# Patient Record
Sex: FEMALE | Race: BLACK OR AFRICAN AMERICAN | NOT HISPANIC OR LATINO | URBAN - METROPOLITAN AREA
[De-identification: names, ages, dates, MRNs, and addresses within clinical notes are randomized per-mention and may not be internally consistent; named-entity substitution may affect disease eponyms.]

---

## 2017-01-22 ENCOUNTER — INPATIENT (INPATIENT)
Facility: HOSPITAL | Age: 27
LOS: 2 days | Discharge: HOME | End: 2017-01-25
Attending: SURGERY

## 2017-06-27 DIAGNOSIS — R10.84 GENERALIZED ABDOMINAL PAIN: ICD-10-CM

## 2017-06-27 DIAGNOSIS — K81.0 ACUTE CHOLECYSTITIS: ICD-10-CM

## 2018-06-04 ENCOUNTER — APPOINTMENT (OUTPATIENT)
Dept: ENDOCRINOLOGY | Facility: CLINIC | Age: 28
End: 2018-06-04

## 2018-12-20 ENCOUNTER — INPATIENT (INPATIENT)
Facility: HOSPITAL | Age: 28
LOS: 1 days | Discharge: HOME | End: 2018-12-22
Attending: OBSTETRICS & GYNECOLOGY | Admitting: OBSTETRICS & GYNECOLOGY

## 2018-12-20 VITALS
DIASTOLIC BLOOD PRESSURE: 60 MMHG | TEMPERATURE: 99 F | WEIGHT: 229.94 LBS | OXYGEN SATURATION: 99 % | HEART RATE: 73 BPM | SYSTOLIC BLOOD PRESSURE: 133 MMHG | HEIGHT: 67.5 IN | RESPIRATION RATE: 18 BRPM

## 2018-12-20 DIAGNOSIS — Z90.49 ACQUIRED ABSENCE OF OTHER SPECIFIED PARTS OF DIGESTIVE TRACT: Chronic | ICD-10-CM

## 2018-12-20 LAB
ALBUMIN SERPL ELPH-MCNC: 4.4 G/DL — SIGNIFICANT CHANGE UP (ref 3.5–5.2)
ALP SERPL-CCNC: 42 U/L — SIGNIFICANT CHANGE UP (ref 30–115)
ALT FLD-CCNC: 9 U/L — SIGNIFICANT CHANGE UP (ref 0–41)
ANION GAP SERPL CALC-SCNC: 21 MMOL/L — HIGH (ref 7–14)
APPEARANCE UR: ABNORMAL
APPEARANCE UR: CLEAR — SIGNIFICANT CHANGE UP
AST SERPL-CCNC: 13 U/L — SIGNIFICANT CHANGE UP (ref 0–41)
BACTERIA # UR AUTO: ABNORMAL /HPF
BASOPHILS # BLD AUTO: 0.01 K/UL — SIGNIFICANT CHANGE UP (ref 0–0.2)
BASOPHILS NFR BLD AUTO: 0.1 % — SIGNIFICANT CHANGE UP (ref 0–1)
BILIRUB SERPL-MCNC: 0.4 MG/DL — SIGNIFICANT CHANGE UP (ref 0.2–1.2)
BILIRUB UR-MCNC: ABNORMAL
BILIRUB UR-MCNC: ABNORMAL
BUN SERPL-MCNC: 10 MG/DL — SIGNIFICANT CHANGE UP (ref 10–20)
CALCIUM SERPL-MCNC: 9.5 MG/DL — SIGNIFICANT CHANGE UP (ref 8.5–10.1)
CHLORIDE SERPL-SCNC: 99 MMOL/L — SIGNIFICANT CHANGE UP (ref 98–110)
CO2 SERPL-SCNC: 19 MMOL/L — SIGNIFICANT CHANGE UP (ref 17–32)
COLOR SPEC: ABNORMAL
COLOR SPEC: SIGNIFICANT CHANGE UP
CREAT SERPL-MCNC: 0.6 MG/DL — LOW (ref 0.7–1.5)
DIFF PNL FLD: ABNORMAL
DIFF PNL FLD: ABNORMAL
EOSINOPHIL # BLD AUTO: 0 K/UL — SIGNIFICANT CHANGE UP (ref 0–0.7)
EOSINOPHIL NFR BLD AUTO: 0 % — SIGNIFICANT CHANGE UP (ref 0–8)
EPI CELLS # UR: ABNORMAL /HPF
GLUCOSE SERPL-MCNC: 101 MG/DL — HIGH (ref 70–99)
GLUCOSE UR QL: NEGATIVE — SIGNIFICANT CHANGE UP
GLUCOSE UR QL: NEGATIVE — SIGNIFICANT CHANGE UP
HCG SERPL-ACNC: HIGH MIU/ML
HCT VFR BLD CALC: 36.3 % — LOW (ref 37–47)
HGB BLD-MCNC: 12.5 G/DL — SIGNIFICANT CHANGE UP (ref 12–16)
IMM GRANULOCYTES NFR BLD AUTO: 0.3 % — SIGNIFICANT CHANGE UP (ref 0.1–0.3)
KETONES UR-MCNC: >=80
KETONES UR-MCNC: >=80
LACTATE SERPL-SCNC: 0.9 MMOL/L — SIGNIFICANT CHANGE UP (ref 0.5–2.2)
LEUKOCYTE ESTERASE UR-ACNC: ABNORMAL
LEUKOCYTE ESTERASE UR-ACNC: ABNORMAL
LIDOCAIN IGE QN: 18 U/L — SIGNIFICANT CHANGE UP (ref 7–60)
LYMPHOCYTES # BLD AUTO: 1.18 K/UL — LOW (ref 1.2–3.4)
LYMPHOCYTES # BLD AUTO: 13.3 % — LOW (ref 20.5–51.1)
MAGNESIUM SERPL-MCNC: 2.1 MG/DL — SIGNIFICANT CHANGE UP (ref 1.8–2.4)
MCHC RBC-ENTMCNC: 27.7 PG — SIGNIFICANT CHANGE UP (ref 27–31)
MCHC RBC-ENTMCNC: 34.4 G/DL — SIGNIFICANT CHANGE UP (ref 32–37)
MCV RBC AUTO: 80.5 FL — LOW (ref 81–99)
MONOCYTES # BLD AUTO: 0.66 K/UL — HIGH (ref 0.1–0.6)
MONOCYTES NFR BLD AUTO: 7.4 % — SIGNIFICANT CHANGE UP (ref 1.7–9.3)
NEUTROPHILS # BLD AUTO: 7.02 K/UL — HIGH (ref 1.4–6.5)
NEUTROPHILS NFR BLD AUTO: 78.9 % — HIGH (ref 42.2–75.2)
NITRITE UR-MCNC: NEGATIVE — SIGNIFICANT CHANGE UP
NITRITE UR-MCNC: NEGATIVE — SIGNIFICANT CHANGE UP
PH UR: 6 — SIGNIFICANT CHANGE UP (ref 5–8)
PH UR: 6 — SIGNIFICANT CHANGE UP (ref 5–8)
PLATELET # BLD AUTO: 289 K/UL — SIGNIFICANT CHANGE UP (ref 130–400)
POTASSIUM SERPL-MCNC: 3.6 MMOL/L — SIGNIFICANT CHANGE UP (ref 3.5–5)
POTASSIUM SERPL-SCNC: 3.6 MMOL/L — SIGNIFICANT CHANGE UP (ref 3.5–5)
PROT SERPL-MCNC: 7.6 G/DL — SIGNIFICANT CHANGE UP (ref 6–8)
PROT UR-MCNC: 100
PROT UR-MCNC: 30
RBC # BLD: 4.51 M/UL — SIGNIFICANT CHANGE UP (ref 4.2–5.4)
RBC # FLD: 14.2 % — SIGNIFICANT CHANGE UP (ref 11.5–14.5)
RBC CASTS # UR COMP ASSIST: ABNORMAL /HPF
SODIUM SERPL-SCNC: 139 MMOL/L — SIGNIFICANT CHANGE UP (ref 135–146)
SP GR SPEC: >=1.03 — SIGNIFICANT CHANGE UP (ref 1.01–1.03)
SP GR SPEC: >=1.03 — SIGNIFICANT CHANGE UP (ref 1.01–1.03)
UROBILINOGEN FLD QL: 1 (ref 0.2–0.2)
UROBILINOGEN FLD QL: 1 (ref 0.2–0.2)
WBC # BLD: 8.9 K/UL — SIGNIFICANT CHANGE UP (ref 4.8–10.8)
WBC # FLD AUTO: 8.9 K/UL — SIGNIFICANT CHANGE UP (ref 4.8–10.8)
WBC UR QL: ABNORMAL /HPF

## 2018-12-20 RX ORDER — SODIUM CHLORIDE 9 MG/ML
1000 INJECTION, SOLUTION INTRAVENOUS
Qty: 0 | Refills: 0 | Status: DISCONTINUED | OUTPATIENT
Start: 2018-12-20 | End: 2018-12-21

## 2018-12-20 RX ORDER — CEFTRIAXONE 500 MG/1
1 INJECTION, POWDER, FOR SOLUTION INTRAMUSCULAR; INTRAVENOUS ONCE
Qty: 0 | Refills: 0 | Status: COMPLETED | OUTPATIENT
Start: 2018-12-20 | End: 2018-12-20

## 2018-12-20 RX ORDER — CEFTRIAXONE 500 MG/1
1 INJECTION, POWDER, FOR SOLUTION INTRAMUSCULAR; INTRAVENOUS EVERY 24 HOURS
Qty: 0 | Refills: 0 | Status: DISCONTINUED | OUTPATIENT
Start: 2018-12-21 | End: 2018-12-22

## 2018-12-20 RX ORDER — NITROFURANTOIN MACROCRYSTAL 50 MG
100 CAPSULE ORAL
Qty: 0 | Refills: 0 | Status: DISCONTINUED | OUTPATIENT
Start: 2018-12-20 | End: 2018-12-20

## 2018-12-20 RX ORDER — ONDANSETRON 8 MG/1
4 TABLET, FILM COATED ORAL ONCE
Qty: 0 | Refills: 0 | Status: COMPLETED | OUTPATIENT
Start: 2018-12-20 | End: 2018-12-20

## 2018-12-20 RX ORDER — SODIUM CHLORIDE 9 MG/ML
2000 INJECTION INTRAMUSCULAR; INTRAVENOUS; SUBCUTANEOUS ONCE
Qty: 0 | Refills: 0 | Status: COMPLETED | OUTPATIENT
Start: 2018-12-20 | End: 2018-12-20

## 2018-12-20 RX ORDER — ONDANSETRON 8 MG/1
4 TABLET, FILM COATED ORAL EVERY 6 HOURS
Qty: 0 | Refills: 0 | Status: DISCONTINUED | OUTPATIENT
Start: 2018-12-20 | End: 2018-12-22

## 2018-12-20 RX ADMIN — Medication 100 MILLIGRAM(S): at 07:57

## 2018-12-20 RX ADMIN — SODIUM CHLORIDE 4000 MILLILITER(S): 9 INJECTION INTRAMUSCULAR; INTRAVENOUS; SUBCUTANEOUS at 05:45

## 2018-12-20 RX ADMIN — CEFTRIAXONE 100 GRAM(S): 500 INJECTION, POWDER, FOR SOLUTION INTRAMUSCULAR; INTRAVENOUS at 10:41

## 2018-12-20 RX ADMIN — Medication 1 TABLET(S): at 14:33

## 2018-12-20 RX ADMIN — ONDANSETRON 4 MILLIGRAM(S): 8 TABLET, FILM COATED ORAL at 05:44

## 2018-12-20 RX ADMIN — ONDANSETRON 4 MILLIGRAM(S): 8 TABLET, FILM COATED ORAL at 14:35

## 2018-12-20 RX ADMIN — SODIUM CHLORIDE 125 MILLILITER(S): 9 INJECTION, SOLUTION INTRAVENOUS at 12:59

## 2018-12-20 NOTE — CONSULT NOTE ADULT - SUBJECTIVE AND OBJECTIVE BOX
LIZETTE WIGGINS [id number]  28y Female [id number] pregnant with PMH/PSH below notable for rheumatic heart disease treated with a long term course of antibiotics and lap jose c 2 years ago at this institution. Patient complains of 2 days of lower abdominal pain, nausea and vomiting Patient doesn't recall her last bowel movement but states she has not had any issues or changes in bowel habits. Patient had bedside US performed confirming live intrauterine pregnancy and left sided ovarian cysts. Due to pregnancy CT scan is contraindicated so surgery consult placed to r/o appendicitis.      PAST MEDICAL & SURGICAL HISTORY:  Rheumatic fever/heart disease  History of laparoscopic cholecystectomy        MEDICATIONS  (STANDING):  cefTRIAXone   IVPB 1 Gram(s) IV Intermittent Once  nitrofurantoin monohydrate/macrocrystals (MACROBID) 100 milliGRAM(s) Oral two times a day with meals    MEDICATIONS  (PRN):      Allergies    No Known Allergies    Intolerances        REVIEW OF SYSTEMS    [X] A ten-point review of systems was otherwise negative except as noted.  [ ] Due to altered mental status/intubation, subjective information were not able to be obtained from the patient. History was obtained, to the extent possible, from review of the chart and collateral sources of information.      Vital Signs Last 24 Hrs  T(C): 37.4 (20 Dec 2018 08:03), Max: 37.4 (20 Dec 2018 08:03)  T(F): 99.4 (20 Dec 2018 08:03), Max: 99.4 (20 Dec 2018 08:03)  HR: 61 (20 Dec 2018 08:03) (61 - 73)  BP: 103/59 (20 Dec 2018 08:03) (103/59 - 133/60)  BP(mean): --  RR: 20 (20 Dec 2018 08:03) (18 - 20)  SpO2: 100% (20 Dec 2018 08:03) (99% - 100%)    PHYSICAL EXAM:  GENERAL: NAD, well-appearing  CHEST/LUNG: Clear to auscultation bilaterally  HEART: Regular rate and rhythm  ABDOMEN: Soft, gravid tender to rlq and hypogastric region;   EXTREMITIES:  No clubbing, cyanosis, or edema      LABS:  Labs:  CAPILLARY BLOOD GLUCOSE                              12.5   8.90  )-----------( 289      ( 20 Dec 2018 05:13 )             36.3       Auto Neutrophil %: 78.9 % (12-20-18 @ 05:13)  Auto Immature Granulocyte %: 0.3 % (12-20-18 @ 05:13)    12-20    139  |  99  |  10  ----------------------------<  101<H>  3.6   |  19  |  0.6<L>      Calcium, Total Serum: 9.5 mg/dL (12-20-18 @ 05:13)      LFTs:             7.6  | 0.4  | 13       ------------------[42      ( 20 Dec 2018 05:13 )  4.4  | x    | 9           Lipase:18     Amylase:x         Lactate, Blood: 0.9 mmol/L (12-20-18 @ 05:13)      Urinalysis Basic - ( 20 Dec 2018 05:13 )    Color: Dark Yellow / Appearance: Cloudy / SG: >=1.030 / pH: x  Gluc: x / Ketone: >=80  / Bili: Moderate / Urobili: 1.0   Blood: x / Protein: 100 / Nitrite: Negative   Leuk Esterase: Small / RBC: 6-10 /HPF / WBC 6-10 /HPF   Sq Epi: x / Non Sq Epi: Moderate /HPF / Bacteria: Moderate /HPF      RADIOLOGY & ADDITIONAL STUDIES:  < from: US Transvaginal (12.20.18 @ 07:00) >  Single live intrauterine gestation with a crown-rump length corresponding   to gestational age of 5 weeks and 6 days. Fetal heart rate is faintly   visualized although cannot be quantified, likely due to early gestation.    Multiple left adnexal cysts, including a 2.1 cm complex cyst.    Small pelvic free fluid.    < end of copied text >

## 2018-12-20 NOTE — CONSULT NOTE ADULT - ASSESSMENT
28F [id number] pregnancy as per ultrasound presents with abdominal pain and vomiting for 2 days, no fevers or elevation in wbc. UA+ve and signs of CVA tenderness concerning for pyelo    Plan:  As per ED patient being admitted to OB service for pyelo  MRI to r/o appendicitis  Will continue to follow     Plan discussed with Dr. Pineda

## 2018-12-20 NOTE — ED PROVIDER NOTE - MEDICAL DECISION MAKING DETAILS
Pt with continued symptoms in the ED despite treatment.  Pt needed to be admitted for continued treatment.  Pt adm to gyn with surgery following.

## 2018-12-20 NOTE — ED PROVIDER NOTE - NS ED ROS FT
Constitutional: NAD  Eyes: No visual changes, eye pain or discharge.  ENMT: No hearing changes, pain, discharge or infections. No neck pain or stiffness.  Cardiac: No chest pain, SOB or edema. No chest pain with exertion.  Respiratory: No cough or respiratory distress.   GI: +nausea, vomiting, abdominal pain.  : No dysuria, frequency or burning.  MS: No myalgia, muscle weakness, joint pain or back pain.  Neuro: No headache. +generalized weakness. No LOC.  Skin: No skin rash.  Heme: No bruising

## 2018-12-20 NOTE — ED PROVIDER NOTE - CARE PLAN
Principal Discharge DX:	UTI (urinary tract infection)  Secondary Diagnosis:	RLQ abdominal pain  Secondary Diagnosis:	Flank pain Principal Discharge DX:	Pyelonephritis, acute  Secondary Diagnosis:	RLQ abdominal pain  Secondary Diagnosis:	Flank pain  Secondary Diagnosis:	Pregnant

## 2018-12-20 NOTE — ED ADULT NURSE NOTE - CHPI ED NUR SYMPTOMS NEG
no diarrhea/no chills/no blood in stool/no burning urination/no hematuria/no dysuria/no abdominal distension/no fever

## 2018-12-20 NOTE — ED PROVIDER NOTE - ATTENDING CONTRIBUTION TO CARE
I personally evaluated the patient. I reviewed the Resident’s or Physician Assistant’s note (as assigned above), and agree with the findings and plan except as documented in my note.  29 F, LMP 11/9/18, + home pregnancy test 4 days ago with about 6 days of nausea, nbnb vomiting and right pelvic pain. No lightheadedness, no LOC. No dysuria, hematuria, no vaginal bleeding. VS reviewed, pt well appearing, NAD. Head ncat, normal s1s2 without any murmurs, Lungs CTAB with normal work of breathing. abd +BS, s/nd/+ right pelvic ttp w/o guarding or rebound, extremities wnl, neuro exam grossly normal. No acute skin rashes. Plan is labs, antiemetics, pelvic US and reassess.

## 2018-12-20 NOTE — ED PROVIDER NOTE - PROGRESS NOTE DETAILS
received sign out from Dr. Zuniga Endorsed to Dr. Mims Pt seen by gyn and surgery in the ED.  Gyn feels sx c/w pyelonephritis and adm pt to their service.  Surg eval and secondary to rlq ttp rec MRI to eval for appi.  Dr. Schroeder accepts pt for adm and gyn/surg following MRI.  Pt agreeable to plan.

## 2018-12-20 NOTE — CONSULT NOTE ADULT - ASSESSMENT
27 yo  at 5w6d GA by LMP of 2018 consistent with CRL today    FINAL ASSESSMENT PENDING ATTENDING REVIEW

## 2018-12-20 NOTE — ED PROVIDER NOTE - PHYSICAL EXAMINATION
General: AOx4, Non toxic appearing, NAD, speaking in full sentences.   Skin: Warm and dry, no acute rash.   Head:  Normocephalic, atraumatic.   EENT: PERRL/EOMI, conjunctiva and sclera clear. MM moist, no nasal discharge.    Neck: Supple nt, no meningeal signs.   Heart RRR s1s2 nl, no rub/murmur.   Lungs- No retractions, BS equal, CTAB.   Abdomen: Soft nd, diffusely ttp over lower abdomen, w/ guarding.    Extremities- moves all, +equal distal pulses, brisk cap refill. No LE edema, calves nttp b/l.  Neuro: Sensation wnl, CN 2-12 grossly intact. +5/5 muscle strength throughout.  Psych: Cooperative, appropriate General: AOx4, Non toxic appearing, NAD, speaking in full sentences.   Skin: Warm and dry, no acute rash.   Head:  Normocephalic, atraumatic.   EENT: PERRL/EOMI, conjunctiva and sclera clear. MM moist, no nasal discharge.    Neck: Supple nt, no meningeal signs.   Heart RRR s1s2 nl, no rub/murmur.   Lungs- No retractions, BS equal, CTAB.   Abdomen: Soft nd, diffusely ttp over lower abdomen, w/ guarding.    : Normal appearing female genitalia, no masses/lesions/erythema/discharge. Os closed. No active bleeding/clots. No CMT. +R adnexal tenderness.   Extremities- moves all, +equal distal pulses, brisk cap refill. No LE edema, calves nttp b/l.  Neuro: Sensation wnl, CN 2-12 grossly intact. +5/5 muscle strength throughout.  Psych: Cooperative, appropriate

## 2018-12-20 NOTE — ED PROVIDER NOTE - OBJECTIVE STATEMENT
28F @6 weeks EGA p/f chills, nausea and vomiting since Sunday. Ptc/o lower abdominal cramping which started today. Denies fever, diarrhea, dysuria, vaginal bleeding. Pt sees Dr Monterroso, had confirmatory US last week was told it was too early to see FHR.

## 2018-12-20 NOTE — H&P ADULT - ASSESSMENT
29 yo  at 5w6d GA by LMP of 2018 consistent with CRL today with left ovarian simple cyst and likely left corpus luteum cyst, with pyelonephritis  -Discussed presence of ovarian cysts with patient and that ovarian torsion is very unlikely. Discussed risk of appendicitis vs pyelonephritis, given the full clinical picture, pyelonephritis is far more likely  -Admit to GYN  -Rocephin 1g IV Q24H  -Retroperitoneal sono  -MRI per surgery recommendations to r/o appendicitis, will f/u futher recommendations from general surgery  -AM labs  -NPO for MRI, then regular diet as tolerated  -IV fluids  -Prenatal vitamins  -f/u temps  -Zofran PRN for nausea    Discussed with Dr. Nichols and Dr. Cisneros

## 2018-12-20 NOTE — H&P ADULT - NSHPLABSRESULTS_GEN_ALL_CORE
12.5   8.90  )-----------( 289      ( 20 Dec 2018 05:13 )             36.3     HCG Quantitative, Serum: 56733.0 mIU/mL (12-20-18 @ 05:13)      12-20    139  |  99  |  10  ----------------------------<  101<H>  3.6   |  19  |  0.6<L>    Ca    9.5      20 Dec 2018 05:13  Mg     2.1     12-20    TPro  7.6  /  Alb  4.4  /  TBili  0.4  /  DBili  x   /  AST  13  /  ALT  9   /  AlkPhos  42  12-20      Urinalysis Basic - ( 20 Dec 2018 05:13 )    Color: Dark Yellow / Appearance: Cloudy / SG: >=1.030 / pH: x  Gluc: x / Ketone: >=80  / Bili: Moderate / Urobili: 1.0   Blood: x / Protein: 100 / Nitrite: Negative   Leuk Esterase: Small / RBC: 6-10 /HPF / WBC 6-10 /HPF   Sq Epi: x / Non Sq Epi: Moderate /HPF / Bacteria: Moderate /HPF          RADIOLOGY & ADDITIONAL STUDIES:  < from: US Transvaginal (12.20.18 @ 07:00) >  FINDINGS:    UTERUS: Measures 8.8 x 6.3 x 4.8 cm. There is a single intrauterine   gestation with visualization of a yolk sac. Crown-rump length corresponds   to a gestational age of 5 weeks and 6 days; mean sac diameter corresponds   to a gestational age of 6 weeks and 3 days. Fetal heart rate is faintly   visualized although cannot be quantified. No evidence of subchorionic   hematoma. There is an intramural uterine fibroid measuring 1.5 cm.    RIGHT OVARY: measures 3.4 x 1.9 x 1.7 cm, and is unremarkable. Doppler   flow is demonstrated to the right ovary.     LEFT OVARY: measures 5.8 x 4.3 x 3.5 cm. There are multiple cysts in the   left adnexa including a simple cyst measuring up to 3.6 cm and a complex   cyst measuring up to 2.1 cm. Doppler flow is demonstrated to the left   ovary.     OTHER: There is small pelvic free fluid.    IMPRESSION:    Single live intrauterine gestation with a crown-rump length corresponding   to gestational age of 5 weeks and 6 days. Fetal heart rate is faintly   visualized although cannot be quantified, likely due to early gestation.    Multiple left adnexal cysts, including a 2.1 cm complex cyst.    Small pelvic free fluid.    Follow-up ultrasound is recommended.    < end of copied text >

## 2018-12-20 NOTE — H&P ADULT - NSHPPHYSICALEXAM_GEN_ALL_CORE
Vital Signs Last 24 Hrs  T(F): 99.4 (20 Dec 2018 08:03), Max: 99.4 (20 Dec 2018 08:03)  HR: 61 (20 Dec 2018 08:03) (61 - 73)  BP: 103/59 (20 Dec 2018 08:03) (103/59 - 133/60)  RR: 20 (20 Dec 2018 08:03) (18 - 20)    General Appearance - AAOx3, NAD  Heart - S1S2 regular rate and rhythm  Lung - CTA Bilaterally  Abdomen - Soft, RLQ mild tenderness, nondistended, no rebound, no rigidity, no guarding, bowel sounds present, suprapubic tenderness, right CVA tenderness    GYN/Pelvis:    Labia Majora - Normal  Labia Minora - Normal  Clitoris - Normal  Urethra - Normal  Vagina - Normal, no bleeding, no discharge  Cervix - Normal, closed, no bleeding, no abnormalities, no CMT    Uterus:  Size - Normal  Tenderness - None  Mass - None  Freely mobile    Adnexa:  Masses - None  Tenderness - None Vital Signs Last 24 Hrs  T(F): 99.4 (20 Dec 2018 08:03), Max: 99.4 (20 Dec 2018 08:03)  HR: 61 (20 Dec 2018 08:03) (61 - 73)  BP: 103/59 (20 Dec 2018 08:03) (103/59 - 133/60)  RR: 20 (20 Dec 2018 08:03) (18 - 20)    ROS: negative for constitutional, heent, cardio, resp, gi, ext, neuro, hematologic, psychiatric symptoms        General Appearance - AAOx3, NAD  Heart - S1S2 regular rate and rhythm  Lung - CTA Bilaterally  Abdomen - Soft, RLQ mild tenderness, nondistended, no rebound, no rigidity, no guarding, bowel sounds present, suprapubic tenderness, right CVA tenderness    GYN/Pelvis:    Labia Majora - Normal  Labia Minora - Normal  Clitoris - Normal  Urethra - Normal  Vagina - Normal, no bleeding, no discharge  Cervix - Normal, closed, no bleeding, no abnormalities, no CMT    Uterus:  Size - Normal  Tenderness - None  Mass - None  Freely mobile    Adnexa:  Masses - None  Tenderness - None

## 2018-12-20 NOTE — CONSULT NOTE ADULT - SUBJECTIVE AND OBJECTIVE BOX
Chief Complaint: Nausea, vomiting, and abdominal pain    HPI: 29 yo  at 5w6d GA by LMP of 2018 confirmed by ED krystalo today, h/o rheumatic fever with valve defect at age 12 s/p prophylactic antibiotics from age 12 to age 19 presents for nausea and vomiting for 3 days, worsening, several times per day, nonbloody, nonbilious, associated with inability to tolerate PO for 2 days. She reports cramping lower abdominal pain, mild in nature, nonradiating, worse on the right side, starting yesterday afternoon, intermittent. She did not void at all yesterday, however she was able to void clear urine today in ED following administration of IV fluids. She denies dizziness, weakness, fevers, chills, shortness of breath, chest pain, dysuria, hematuria, vaginal bleeding, vaginal discharge, diarrhea, or constipation. This was an unplanned but desired pregnancy.    She saw her obgyn Dr. Monterroso 3 days ago who confirmed her pregnancy. At that time she was only feeling mild nausea.    She currently follow with a primary care physician and was told she no longer required any intervention for her history of rheumatic fever.    Ob/Gyn History:                   LMP -   2018                Cycle Length - regular, monthly  Denies history of ovarian cysts, uterine fibroids, abnormal paps, or STIs  Last Pap Smear - 1 week ago.    Denies the following: constitutional symptoms, visual symptoms, cardiovascular symptoms, respiratory symptoms, GI symptoms, musculoskeletal symptoms, skin symptoms, neurologic symptoms, hematologic symptoms, allergic symptoms, psychiatric symptoms  Except any pertinent positives listed.     Home Medications:  Prenatal vitamins    Allergies  No Known Allergies    PAST MEDICAL & SURGICAL HISTORY:  Rheumatic fever/heart disease  History of laparoscopic cholecystectomy      FAMILY HISTORY:  Denies    SOCIAL HISTORY: Denies cigarette use, alcohol use, or illicit drug use    Vital Signs Last 24 Hrs  T(F): 99.4 (20 Dec 2018 08:03), Max: 99.4 (20 Dec 2018 08:03)  HR: 61 (20 Dec 2018 08:03) (61 - 73)  BP: 103/59 (20 Dec 2018 08:03) (103/59 - 133/60)  RR: 20 (20 Dec 2018 08:03) (18 - 20)    General Appearance - AAOx3, NAD  Heart - S1S2 regular rate and rhythm  Lung - CTA Bilaterally  Abdomen - Soft, nontender, nondistended, no rebound, no rigidity, no guarding, bowel sounds present    GYN/Pelvis:    Labia Majora - Normal  Labia Minora - Normal  Clitoris - Normal  Urethra - Normal  Vagina - Normal  Cervix - Normal    Uterus:  Size - Normal  Tenderness - None  Mass - None  Freely mobile    Adnexa:  Masses - None  Tenderness - None      LABS:                        12.5   8.90  )-----------( 289      ( 20 Dec 2018 05:13 )             36.3     HCG Quantitative, Serum: 94599.0 mIU/mL (18 @ 05:13)          139  |  99  |  10  ----------------------------<  101<H>  3.6   |  19  |  0.6<L>    Ca    9.5      20 Dec 2018 05:13  Mg     2.1         TPro  7.6  /  Alb  4.4  /  TBili  0.4  /  DBili  x   /  AST  13  /  ALT  9   /  AlkPhos  42        Urinalysis Basic - ( 20 Dec 2018 05:13 )    Color: Dark Yellow / Appearance: Cloudy / SG: >=1.030 / pH: x  Gluc: x / Ketone: >=80  / Bili: Moderate / Urobili: 1.0   Blood: x / Protein: 100 / Nitrite: Negative   Leuk Esterase: Small / RBC: 6-10 /HPF / WBC 6-10 /HPF   Sq Epi: x / Non Sq Epi: Moderate /HPF / Bacteria: Moderate /HPF          RADIOLOGY & ADDITIONAL STUDIES:  < from: US Transvaginal (18 @ 07:00) >  FINDINGS:    UTERUS: Measures 8.8 x 6.3 x 4.8 cm. There is a single intrauterine   gestation with visualization of a yolk sac. Crown-rump length corresponds   to a gestational age of 5 weeks and 6 days; mean sac diameter corresponds   to a gestational age of 6 weeks and 3 days. Fetal heart rate is faintly   visualized although cannot be quantified. No evidence of subchorionic   hematoma. There is an intramural uterine fibroid measuring 1.5 cm.    RIGHT OVARY: measures 3.4 x 1.9 x 1.7 cm, and is unremarkable. Doppler   flow is demonstrated to the right ovary.     LEFT OVARY: measures 5.8 x 4.3 x 3.5 cm. There are multiple cysts in the   left adnexa including a simple cyst measuring up to 3.6 cm and a complex   cyst measuring up to 2.1 cm. Doppler flow is demonstrated to the left   ovary.     OTHER: There is small pelvic free fluid.    IMPRESSION:    Single live intrauterine gestation with a crown-rump length corresponding   to gestational age of 5 weeks and 6 days. Fetal heart rate is faintly   visualized although cannot be quantified, likely due to early gestation.    Multiple left adnexal cysts, including a 2.1 cm complex cyst.    Small pelvic free fluid.    Follow-up ultrasound is recommended.    < end of copied text > Chief Complaint: Nausea, vomiting, and abdominal pain    HPI: 29 yo  at 5w6d GA by LMP of 2018 confirmed by ED krystalo today, h/o rheumatic fever with valve defect at age 12 s/p prophylactic antibiotics from age 12 to age 19 presents for nausea and vomiting for 3 days, worsening, several times per day, nonbloody, nonbilious, associated with inability to tolerate PO for 2 days. She reports cramping lower abdominal pain, mild in nature, nonradiating, worse on the right side, starting yesterday afternoon, intermittent. She did not void at all yesterday, however she was able to void clear urine today in ED following administration of IV fluids. She denies dizziness, weakness, fevers, chills, shortness of breath, chest pain, dysuria, hematuria, vaginal bleeding, vaginal discharge, diarrhea, or constipation. This was an unplanned but desired pregnancy.    She saw her obgyn Dr. Monterroso 3 days ago who confirmed her pregnancy. At that time she was only feeling mild nausea.    She currently follow with a primary care physician and was told she no longer required any intervention for her history of rheumatic fever.    Ob/Gyn History:                   LMP -   2018                Cycle Length - regular, monthly  Denies history of ovarian cysts, uterine fibroids, abnormal paps, or STIs  Last Pap Smear - 1 week ago.    Denies the following: constitutional symptoms, visual symptoms, cardiovascular symptoms, respiratory symptoms, GI symptoms, musculoskeletal symptoms, skin symptoms, neurologic symptoms, hematologic symptoms, allergic symptoms, psychiatric symptoms  Except any pertinent positives listed.     Home Medications:  Prenatal vitamins    Allergies  No Known Allergies    PAST MEDICAL & SURGICAL HISTORY:  Rheumatic fever/heart disease  History of laparoscopic cholecystectomy      FAMILY HISTORY:  Denies    SOCIAL HISTORY: Denies cigarette use, alcohol use, or illicit drug use    Vital Signs Last 24 Hrs  T(F): 99.4 (20 Dec 2018 08:03), Max: 99.4 (20 Dec 2018 08:03)  HR: 61 (20 Dec 2018 08:03) (61 - 73)  BP: 103/59 (20 Dec 2018 08:03) (103/59 - 133/60)  RR: 20 (20 Dec 2018 08:03) (18 - 20)    General Appearance - AAOx3, NAD  Heart - S1S2 regular rate and rhythm  Lung - CTA Bilaterally  Abdomen - Soft, RLQ mild tenderness, nondistended, no rebound, no rigidity, no guarding, bowel sounds present, suprapubic tenderness, right CVA tenderness    GYN/Pelvis:    Labia Majora - Normal  Labia Minora - Normal  Clitoris - Normal  Urethra - Normal  Vagina - Normal, no bleeding, no discharge  Cervix - Normal, closed, no bleeding, no abnormalities, no CMT    Uterus:  Size - Normal  Tenderness - None  Mass - None  Freely mobile    Adnexa:  Masses - None  Tenderness - None      LABS:                        12.5   8.90  )-----------( 289      ( 20 Dec 2018 05:13 )             36.3     HCG Quantitative, Serum: 19901.0 mIU/mL (18 @ 05:13)          139  |  99  |  10  ----------------------------<  101<H>  3.6   |  19  |  0.6<L>    Ca    9.5      20 Dec 2018 05:13  Mg     2.1         TPro  7.6  /  Alb  4.4  /  TBili  0.4  /  DBili  x   /  AST  13  /  ALT  9   /  AlkPhos  42        Urinalysis Basic - ( 20 Dec 2018 05:13 )    Color: Dark Yellow / Appearance: Cloudy / SG: >=1.030 / pH: x  Gluc: x / Ketone: >=80  / Bili: Moderate / Urobili: 1.0   Blood: x / Protein: 100 / Nitrite: Negative   Leuk Esterase: Small / RBC: 6-10 /HPF / WBC 6-10 /HPF   Sq Epi: x / Non Sq Epi: Moderate /HPF / Bacteria: Moderate /HPF          RADIOLOGY & ADDITIONAL STUDIES:  < from: US Transvaginal (18 @ 07:00) >  FINDINGS:    UTERUS: Measures 8.8 x 6.3 x 4.8 cm. There is a single intrauterine   gestation with visualization of a yolk sac. Crown-rump length corresponds   to a gestational age of 5 weeks and 6 days; mean sac diameter corresponds   to a gestational age of 6 weeks and 3 days. Fetal heart rate is faintly   visualized although cannot be quantified. No evidence of subchorionic   hematoma. There is an intramural uterine fibroid measuring 1.5 cm.    RIGHT OVARY: measures 3.4 x 1.9 x 1.7 cm, and is unremarkable. Doppler   flow is demonstrated to the right ovary.     LEFT OVARY: measures 5.8 x 4.3 x 3.5 cm. There are multiple cysts in the   left adnexa including a simple cyst measuring up to 3.6 cm and a complex   cyst measuring up to 2.1 cm. Doppler flow is demonstrated to the left   ovary.     OTHER: There is small pelvic free fluid.    IMPRESSION:    Single live intrauterine gestation with a crown-rump length corresponding   to gestational age of 5 weeks and 6 days. Fetal heart rate is faintly   visualized although cannot be quantified, likely due to early gestation.    Multiple left adnexal cysts, including a 2.1 cm complex cyst.    Small pelvic free fluid.    Follow-up ultrasound is recommended.    < end of copied text >

## 2018-12-20 NOTE — H&P ADULT - HISTORY OF PRESENT ILLNESS
Chief Complaint: Nausea, vomiting, and abdominal pain    HPI: 29 yo  at 5w6d GA by LMP of 2018 confirmed by ED krystalo today, h/o rheumatic fever with valve defect at age 12 s/p prophylactic antibiotics from age 12 to age 19 presents for nausea and vomiting for 3 days, worsening, several times per day, nonbloody, nonbilious, associated with inability to tolerate PO for 2 days. She reports cramping lower abdominal pain, mild in nature, nonradiating, worse on the right side, starting yesterday afternoon, intermittent. She did not void at all yesterday, however she was able to void clear urine today in ED following administration of IV fluids. She denies dizziness, weakness, fevers, chills, shortness of breath, chest pain, dysuria, hematuria, vaginal bleeding, vaginal discharge, diarrhea, or constipation. This was an unplanned but desired pregnancy.    She saw her obgyn Dr. Monterroso 3 days ago who confirmed her pregnancy. At that time she was only feeling mild nausea.    She currently follow with a primary care physician and was told she no longer required any intervention for her history of rheumatic fever.    Ob/Gyn History:                   LMP -   2018                Cycle Length - regular, monthly  Denies history of ovarian cysts, uterine fibroids, abnormal paps, or STIs  Last Pap Smear - 1 week ago.

## 2018-12-21 LAB
ANION GAP SERPL CALC-SCNC: 18 MMOL/L — HIGH (ref 7–14)
BUN SERPL-MCNC: 5 MG/DL — LOW (ref 10–20)
CALCIUM SERPL-MCNC: 8.9 MG/DL — SIGNIFICANT CHANGE UP (ref 8.5–10.1)
CHLORIDE SERPL-SCNC: 98 MMOL/L — SIGNIFICANT CHANGE UP (ref 98–110)
CO2 SERPL-SCNC: 22 MMOL/L — SIGNIFICANT CHANGE UP (ref 17–32)
CREAT SERPL-MCNC: 0.6 MG/DL — LOW (ref 0.7–1.5)
CULTURE RESULTS: NO GROWTH — SIGNIFICANT CHANGE UP
GLUCOSE SERPL-MCNC: 100 MG/DL — HIGH (ref 70–99)
HCT VFR BLD CALC: 33.4 % — LOW (ref 37–47)
HGB BLD-MCNC: 11.3 G/DL — LOW (ref 12–16)
MCHC RBC-ENTMCNC: 28 PG — SIGNIFICANT CHANGE UP (ref 27–31)
MCHC RBC-ENTMCNC: 33.8 G/DL — SIGNIFICANT CHANGE UP (ref 32–37)
MCV RBC AUTO: 82.7 FL — SIGNIFICANT CHANGE UP (ref 81–99)
NRBC # BLD: 0 /100 WBCS — SIGNIFICANT CHANGE UP (ref 0–0)
PLATELET # BLD AUTO: 272 K/UL — SIGNIFICANT CHANGE UP (ref 130–400)
POTASSIUM SERPL-MCNC: 3.4 MMOL/L — LOW (ref 3.5–5)
POTASSIUM SERPL-SCNC: 3.4 MMOL/L — LOW (ref 3.5–5)
RBC # BLD: 4.04 M/UL — LOW (ref 4.2–5.4)
RBC # FLD: 14.4 % — SIGNIFICANT CHANGE UP (ref 11.5–14.5)
SODIUM SERPL-SCNC: 138 MMOL/L — SIGNIFICANT CHANGE UP (ref 135–146)
SPECIMEN SOURCE: SIGNIFICANT CHANGE UP
WBC # BLD: 6.48 K/UL — SIGNIFICANT CHANGE UP (ref 4.8–10.8)
WBC # FLD AUTO: 6.48 K/UL — SIGNIFICANT CHANGE UP (ref 4.8–10.8)

## 2018-12-21 RX ADMIN — ONDANSETRON 4 MILLIGRAM(S): 8 TABLET, FILM COATED ORAL at 20:49

## 2018-12-21 RX ADMIN — ONDANSETRON 4 MILLIGRAM(S): 8 TABLET, FILM COATED ORAL at 02:35

## 2018-12-21 RX ADMIN — Medication 1 TABLET(S): at 11:30

## 2018-12-21 RX ADMIN — CEFTRIAXONE 100 GRAM(S): 500 INJECTION, POWDER, FOR SOLUTION INTRAMUSCULAR; INTRAVENOUS at 10:05

## 2018-12-21 RX ADMIN — SODIUM CHLORIDE 125 MILLILITER(S): 9 INJECTION, SOLUTION INTRAVENOUS at 06:15

## 2018-12-21 NOTE — PROGRESS NOTE ADULT - ASSESSMENT
Assessment:  28y Female patient admitted, r/o appendicitis    Plan:  MRI appreciated  No evidence of appendicitis  Gravid uterus  No surgical intervention indicated at this time

## 2018-12-21 NOTE — CONSULT NOTE ADULT - ASSESSMENT
27 yo  at 6w GA by LMP of 2018 consistent with CRL today with left ovarian simple cyst and likely left corpus luteum cyst, with pyelonephritis  -Discussed presence of ovarian cysts with patient and that ovarian torsion is very unlikely. Discussed risk of appendicitis vs pyelonephritis, given the full clinical picture, pyelonephritis is far more likely  -cont Rocephin 1g IV Q24H  -AM labs  -regular diet as tolerated  -IV fluids  -Prenatal vitamins  -f/u temps  -Zofran PRN for nausea 27 yo  at 6w GA by LMP of 2018 consistent with first trimester sono, with left ovarian simple cyst and likely left corpus luteum cyst, with pyelonephritis  -cont Rocephin 1g IV Q24H  -AM labs  -regular diet as tolerated  -IV fluids  -Prenatal vitamins  -f/u temps  -Zofran PRN for nausea 29 yo  at 6w GA by LMP of 2018 consistent with first trimester sono, with left ovarian simple cyst and likely left corpus luteum cyst, with musculoskeletal pain vs clinical pyelonephritis. Given h/o prior similar episode of back pain resolved with naproxen, and lack of fever, or white count, lack of nitrates in the urine, musculoskeletal pain would fit the picture. Given the fact that macrobid PO was given yesterday AM, and the rocephin was started then the catheterized urine culture was done, the liklihood of a negative Ucx is high however, pyelonephritis cannot be ruled out. If the culture is positive then it is likely mild pyelonephritis. The plan is to await the CBC result from the morning to eval WBC count, if elevated, will recc to wait for Ucx to return and treat accordingly, if wbc count still in the normal range, can wait for the 2nd dose of rocephin to be given IV then d/c home with keflex 500mg 4x daily for 7 days and will f/u Ucx final results, and can f/u with PMD for repeat Ucx in 2-3 weeks.   -cont Rocephin 1g IV Q24H  -f/u AM labs  -regular diet as tolerated  -IV fluids  -Prenatal vitamins  -f/u temps  -Zofran PRN for nausea

## 2018-12-21 NOTE — CONSULT NOTE ADULT - SUBJECTIVE AND OBJECTIVE BOX
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 She reports cramping lower abdominal pain, mild in nature, nonradiating, worse on the right side, starting yesterday afternoon, intermittent. She did not void at all yesterday, however she was able to void clear urine today in ED following administration of IV fluids. She denies dizziness, weakness, fevers, chills, shortness of breath, chest pain, dysuria, hematuria, vaginal bleeding, vaginal discharge, diarrhea, or constipation. This was an unplanned but desired pregnancy. She saw her obgyn Dr. Monterroso 3 days ago who confirmed her pregnancy. At that time she was only feeling mild nausea. She currently follow with a primary care physician and was told she no longer required any intervention for her history of rheumatic fever. (20 Dec 2018 11:55)    Currently the patient reports: she feels better, overall improved, still feels some lower abdominal pain and back pain but much improved. Denies chills, dysuria, hematuria, nausea, vomiting, chest pain, SOB, diarrhea, or constipation.    Ob/Gyn History:                   LMP -   2018                Cycle Length - regular, monthly  Denies history of ovarian cysts, uterine fibroids, abnormal paps, or STIs  Last Pap Smear - 1 week ago    PAST MEDICAL & SURGICAL HISTORY:  Rheumatic fever/heart disease  History of laparoscopic cholecystectomy    FAMILY HISTORY:  No pertinent family history in first degree relatives    Social History: Denies ETOH, smoking and drugs.     Home medications: Home Medications:    No Known Allergies    REVIEW OF SYSTEMS:  CONSTITUTIONAL: No weakness, fevers or chills  EYES/ENT: No visual changes;  No vertigo or throat pain   NECK: No pain or stiffness  RESPIRATORY: No cough, wheezing, hemoptysis; No shortness of breath  CARDIOVASCULAR: No chest pain or palpitations  GASTROINTESTINAL: No abdominal or epigastric pain. No nausea, vomiting, or hematemesis; No diarrhea or constipation. No melena or hematochezia.  GENITOURINARY: No dysuria, frequency or hematuria  NEUROLOGICAL: No numbness or weakness  SKIN: No itching, burning, rashes, or lesions   All other review of systems is negative unless indicated above.    Vital Signs:  Vital Signs Last 24 Hrs  T(C): 36.8 (21 Dec 2018 06:05), Max: 37.4 (20 Dec 2018 08:03)  T(F): 98.3 (21 Dec 2018 06:05), Max: 99.4 (20 Dec 2018 08:03)  HR: 63 (21 Dec 2018 06:05) (61 - 73)  BP: 108/62 (21 Dec 2018 06:05) (103/59 - 136/66)  BP(mean): --  RR: 18 (21 Dec 2018 06:05) (18 - 20)  SpO2: 100% (20 Dec 2018 11:53) (100% - 100%)    Physical Exam:  General: Adult female resting in the bed, in NAD  CVS: RRR, +S1/S2, no murmurs  Lungs: CTAB, no wheezing, rhonchi or rales  Abdomen: +BS, soft, nontender  Pelvic: Deferred  Ext: No edema or calf tenderness  Neuro: Normal DTRs, grossly intact    Labs:                        12.5   8.90  )-----------( 289      ( 20 Dec 2018 05:13 )             36.3         139  |  99  |  10  ----------------------------<  101<H>  3.6   |  19  |  0.6<L>    Ca    9.5      20 Dec 2018 05:13  Mg     2.1         TPro  7.6  /  Alb  4.4  /  TBili  0.4  /  DBili  x   /  AST  13  /  ALT  9   /  AlkPhos  42      Radiology:  < from: MR Abdomen No Cont (18 @ 14:25) >    EXAM:  MR ABDOMEN          PROCEDURE DATE:  2018      INTERPRETATION:  MRI abdomen without IV contrast    Indication: Abdominal pain. Pregnancy.    Technique: MRI abdomen without IV contrast performed. Multiplanar, multi   sequential T1, T2-weighted images of abdomen were obtained without IV   contrast administration.     Comparison: CT abdomen and pelvis 2017.    Findings:    Normal appendix (series 5, image [id number]; series 7, images 28-32).    Apparent increased T2 signal at right upper renal pole (series 7, image   41). No hydronephrosis.     Additional abdominal organs: Visualized liver, spleen, biliary system,   pancreas unremarkable.    Additional findings: Gravid uterus, incompletely evaluated on   nondedicated examination. Nonspecific mild free pelvic fluid. Normal bone   marrow signal. No lymphadenopathy. Left ovarian 4.1 cm cyst. Right ovary   not clearly delineated.    Impression:  1. Normal appendix.  2. Apparent signal abnormality at right upper renal pole; correlate for   pyelonephritis.  3. Left ovarian 4.1 cm cyst.  4. Gravid uterus, incompletely evaluated on nondedicated examination.    MARLIN RANDALL M.D., ATTENDING RADIOLOGIST  This document has been electronically signed. Dec [id number]  2:30PM    < end of copied text >    < from: US Kidney and Bladder (18 @ 14:00) >  EXAM:  US KIDNEYS AND BLADDER          PROCEDURE DATE:  2018      INTERPRETATION:  Clinical History / Reason for exam: Pyelonephritis,   pregnancy.    Comparison: CT abdomen and pelvis 2017    Procedure: Retroperitoneal ultrasound was performed.    Findings:  The right kidney is normal in size, location and echotexture measuring   11.9 cm in length.  There is no evidence of hydronephrosis, atrophy, mass   or calculus.  Normal vascular flow is demonstrated.    The left kidney is normal in size, location and echotexture measuring   11.6 cm in length.  There is no evidence of hydronephrosis, atrophy, mass   or calculus.  Normal vascular flow is demonstrated.      Urinary bladder is partially distended with a volume ofapproximately 81   mL.  No debris or calculus is seen. Bilateral ureteral jets are   visualized.     Impression:  Normal renal and bladder ultrasound.    RAGHAVENDRA MELGAR M.D., ATTENDING RADIOLOGIST  This document has been electronically signed. Dec 20 2018  2:20PM  < end of copied text >    < from: US Transvaginal (18 @ 07:00) >  EXAM:  US TRANSVAGINAL          PROCEDURE DATE:  2018      INTERPRETATION:  CLINICAL HISTORY: Right pelvic pain, pregnancy    COMPARISON: 2017    PROCEDURE: Transabdominal and transvaginal ultrasound of the pelvis was   performed, including Doppler.    LMP: 2018    FINDINGS:    UTERUS: Measures 8.8 x 6.3 x 4.8 cm. There is a single intrauterine   gestation with visualization of a yolk sac. Crown-rump length corresponds   to a gestational age of 5 weeks and 6 days; mean sac diameter corresponds   to a gestational age of 6 weeks and 3 days. Fetal heart rate is faintly   visualized although cannot be quantified. No evidence of subchorionic   hematoma. There is an intramural uterine fibroid measuring 1.5 cm.    RIGHT OVARY: measures 3.4 x 1.9 x 1.7 cm, and is unremarkable. Doppler   flow is demonstrated to the right ovary.     LEFT OVARY: measures 5.8 x 4.3 x 3.5 cm. There are multiple cysts in the   left adnexa including a simple cyst measuring up to 3.6 cm and a complex   cyst measuring up to 2.1 cm. Doppler flow is demonstrated to the left   ovary.     OTHER: There is small pelvic free fluid.    IMPRESSION:    Single live intrauterine gestation with a crown-rump length corresponding   to gestational age of 5 weeks and 6 days. Fetal heart rate is faintly   visualized although cannot be quantified, likely due to early gestation.    Multiple left adnexal cysts, including a 2.1 cm complex cyst.    Small pelvic free fluid.    Follow-up ultrasound is recommended.    RAGHAVENDRA MELGAR M.D., ATTENDING RADIOLOGIST  This document has been electronically signed. Dec 20 2018  8:59AM  < end of copied text >        MEDICATIONS  (STANDING):  cefTRIAXone   IVPB 1 Gram(s) IV Intermittent every 24 hours  lactated ringers. 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 She reports cramping lower abdominal pain, mild in nature, nonradiating, worse on the right side, starting yesterday afternoon, intermittent. She did not void at all yesterday, however she was able to void clear urine today in ED following administration of IV fluids. She denies dizziness, weakness, fevers, chills, shortness of breath, chest pain, dysuria, hematuria, vaginal bleeding, vaginal discharge, diarrhea, or constipation. This was an unplanned but desired pregnancy. She saw her obgyn Dr. Monterroso 3 days ago who confirmed her pregnancy. At that time she was only feeling mild nausea. She currently follow with a primary care physician and was told she no longer required any intervention for her history of rheumatic fever. (20 Dec 2018 11:55)    Currently the patient reports: she feels better, overall improved, still feels nausea and some mild lower abdominal pain and back pain but much improved. Denies chills, dysuria, hematuria, nausea, vomiting, chest pain, SOB, diarrhea, or constipation.    Ob/Gyn History:  , ETOP x1           LMP -   2018                Cycle Length - regular, monthly  Denies history of ovarian cysts, uterine fibroids, abnormal paps, or STIs  Last Pap Smear - 1 week ago with Dr. Monterroso    PAST MEDICAL & SURGICAL HISTORY:  Rheumatic fever/heart disease  History of laparoscopic cholecystectomy    FAMILY HISTORY:  Mother: T2DM, ovarian and breast cancer, hypertension  Father: asthma, hypertension  Sister: T2DM    Social History: Denies ETOH, smoking and drugs.     Home medications: none    No Known Allergies    REVIEW OF SYSTEMS:  CONSTITUTIONAL: No weakness, fevers or chills  EYES/ENT: No visual changes;  No vertigo or throat pain   NECK: No pain or stiffness  RESPIRATORY: No cough, wheezing, hemoptysis; No shortness of breath  CARDIOVASCULAR: No chest pain or palpitations  GASTROINTESTINAL: No abdominal or epigastric pain. No nausea, vomiting, or hematemesis; No diarrhea or constipation. No melena or hematochezia.  GENITOURINARY: No dysuria, frequency or hematuria  NEUROLOGICAL: No numbness or weakness  SKIN: No itching, burning, rashes, or lesions   All other review of systems is negative unless indicated above.    Vital Signs:  Vital Signs Last 24 Hrs  T(C): 36.8 (21 Dec 2018 06:05), Max: 37.4 (20 Dec 2018 08:03)  T(F): 98.3 (21 Dec 2018 06:05), Max: 99.4 (20 Dec 2018 08:03)  HR: 63 (21 Dec 2018 06:05) (61 - 73)  BP: 108/62 (21 Dec 2018 06:05) (103/59 - 136/66)  RR: 18 (21 Dec 2018 06:05) (18 - 20)  SpO2: 100% (20 Dec 2018 11:53) (100% - 100%)    Physical Exam:  General: Adult female resting in the bed, in NAD  CVS: RRR, +S1/S2, no murmurs  Lungs: CTAB, no wheezing, rhonchi or rales  Abdomen: +BS, soft, nontender  Pelvic: Deferred  Ext: No edema or calf tenderness  Neuro: Normal DTRs, grossly intact    Labs:                        12.5   8.90  )-----------( 289      ( 20 Dec 2018 05:13 )             36.3         139  |  99  |  10  ----------------------------<  101<H>  3.6   |  19  |  0.6<L>    Ca    9.5      20 Dec 2018 05:13  Mg     2.1         TPro  7.6  /  Alb  4.4  /  TBili  0.4  /  DBili  x   /  AST  13  /  ALT  9   /  AlkPhos  42      Radiology:  < from: MR Abdomen No Cont (18 @ 14:25) >  EXAM:  MR ABDOMEN        PROCEDURE DATE:  2018    INTERPRETATION:  MRI abdomen without IV contrast  Indication: Abdominal pain. Pregnancy.  Technique: MRI abdomen without IV contrast performed. Multiplanar, multi   sequential T1, T2-weighted images of abdomen were obtained without IV   contrast administration.   Comparison: CT abdomen and pelvis 2017.  Findings:  Normal appendix (series 5, image [id number]; series 7, images 28-32).  Apparent increased T2 signal at right upper renal pole (series 7, image   41). No hydronephrosis.   Additional abdominal organs: Visualized liver, spleen, biliary system,   pancreas unremarkable.  Additional findings: Gravid uterus, incompletely evaluated on   nondedicated examination. Nonspecific mild free pelvic fluid. Normal bone   marrow signal. No lymphadenopathy. Left ovarian 4.1 cm cyst. Right ovary   not clearly delineated.  Impression:  1. Normal appendix.  2. Apparent signal abnormality at right upper renal pole; correlate for   pyelonephritis.  3. Left ovarian 4.1 cm cyst.  4. Gravid uterus, incompletely evaluated on nondedicated examination.  MARLIN RANDALL M.D., ATTENDING RADIOLOGIST  This document has been electronically signed. Dec [id number]  2:30PM    < end of copied text >    < from: US Kidney and Bladder (18 @ 14:00) >  EXAM:  US KIDNEYS AND BLADDER        PROCEDURE DATE:  2018    INTERPRETATION:  Clinical History / Reason for exam: Pyelonephritis,   pregnancy.  Comparison: CT abdomen and pelvis 2017  Procedure: Retroperitoneal ultrasound was performed.  Findings:  The right kidney is normal in size, location and echotexture measuring   11.9 cm in length.  There is no evidence of hydronephrosis, atrophy, mass   or calculus.  Normal vascular flow is demonstrated.  The left kidney is normal in size, location and echotexture measuring   11.6 cm in length.  There is no evidence of hydronephrosis, atrophy, mass   or calculus.  Normal vascular flow is demonstrated.    Urinary bladder is partially distended with a volume ofapproximately 81   mL.  No debris or calculus is seen. Bilateral ureteral jets are   visualized.   Impression:  Normal renal and bladder ultrasound.  RAGHAVENDRA MELGAR M.D., ATTENDING RADIOLOGIST  This document has been electronically signed. Dec 20 2018  2:20PM  < end of copied text >    < from: US Transvaginal (18 @ 07:00) >  EXAM:  US TRANSVAGINAL        PROCEDURE DATE:  2018    INTERPRETATION:  CLINICAL HISTORY: Right pelvic pain, pregnancy  COMPARISON: 2017  PROCEDURE: Transabdominal and transvaginal ultrasound of the pelvis was   performed, including Doppler.  LMP: 2018  FINDINGS:  UTERUS: Measures 8.8 x 6.3 x 4.8 cm. There is a single intrauterine   gestation with visualization of a yolk sac. Crown-rump length corresponds   to a gestational age of 5 weeks and 6 days; mean sac diameter corresponds   to a gestational age of 6 weeks and 3 days. Fetal heart rate is faintly   visualized although cannot be quantified. No evidence of subchorionic   hematoma. There is an intramural uterine fibroid measuring 1.5 cm.  RIGHT OVARY: measures 3.4 x 1.9 x 1.7 cm, and is unremarkable. Doppler   flow is demonstrated to the right ovary.   LEFT OVARY: measures 5.8 x 4.3 x 3.5 cm. There are multiple cysts in the   left adnexa including a simple cyst measuring up to 3.6 cm and a complex   cyst measuring up to 2.1 cm. Doppler flow is demonstrated to the left   ovary.   OTHER: There is small pelvic free fluid.  IMPRESSION:  Single live intrauterine gestation with a crown-rump length corresponding   to gestational age of 5 weeks and 6 days. Fetal heart rate is faintly   visualized although cannot be quantified, likely due to early gestation.  Multiple left adnexal cysts, including a 2.1 cm complex cyst.  Small pelvic free fluid.  Follow-up ultrasound is recommended.  RAGHAVENDRA MELGAR M.D., ATTENDING RADIOLOGIST  This document has been electronically signed. Dec 20 2018  8:59AM  < end of copied text >    MEDICATIONS  (STANDING):  cefTRIAXone   IVPB 1 Gram(s) IV Intermittent every 24 hours  lactated ringers. 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 She reports cramping lower abdominal pain, mild in nature, nonradiating, worse on the right side, starting yesterday afternoon, intermittent. She did not void at all yesterday, however she was able to void clear urine today in ED following administration of IV fluids. She denies dizziness, weakness, fevers, chills, shortness of breath, chest pain, dysuria, hematuria, vaginal bleeding, vaginal discharge, diarrhea, or constipation. This was an unplanned but desired pregnancy. She saw her obgyn Dr. Monterroso 3 days ago who confirmed her pregnancy. At that time she was only feeling mild nausea. She currently follow with a primary care physician and was told she no longer required any intervention for her history of rheumatic fever. (20 Dec 2018 11:55)    Currently the patient reports: she feels better, overall improved, still feels nausea and some mild lower abdominal pain and back pain but much improved. Denies chills, dysuria, hematuria, nausea, vomiting, chest pain, SOB, diarrhea, or constipation.    Ob/Gyn History:  , ETOP x1           LMP -   2018                Cycle Length - regular, monthly  Denies history of ovarian cysts, uterine fibroids, abnormal paps, or STIs  Last Pap Smear - 1 week ago with Dr. Monterroso    PAST MEDICAL & SURGICAL HISTORY:  Rheumatic fever/heart disease  History of laparoscopic cholecystectomy    FAMILY HISTORY:  Mother: T2DM, ovarian and breast cancer, hypertension  Father: asthma, hypertension  Sister: T2DM    Social History: Denies ETOH, smoking and drugs.     Home medications: none    No Known Allergies    REVIEW OF SYSTEMS:  CONSTITUTIONAL: No weakness, fevers or chills  EYES/ENT: No visual changes;  No vertigo or throat pain   NECK: No pain or stiffness  RESPIRATORY: No cough, wheezing, hemoptysis; No shortness of breath  CARDIOVASCULAR: No chest pain or palpitations  GASTROINTESTINAL: No abdominal or epigastric pain. No nausea, vomiting, or hematemesis; No diarrhea or constipation. No melena or hematochezia.  GENITOURINARY: No dysuria, frequency or hematuria  NEUROLOGICAL: No numbness or weakness  SKIN: No itching, burning, rashes, or lesions   All other review of systems is negative unless indicated above.    Vital Signs:  Vital Signs Last 24 Hrs  T(C): 36.8 (21 Dec 2018 06:05), Max: 37.4 (20 Dec 2018 08:03)  T(F): 98.3 (21 Dec 2018 06:05), Max: 99.4 (20 Dec 2018 08:03)  HR: 63 (21 Dec 2018 06:05) (61 - 73)  BP: 108/62 (21 Dec 2018 06:05) (103/59 - 136/66)  RR: 18 (21 Dec 2018 06:05) (18 - 20)  SpO2: 100% (20 Dec 2018 11:53) (100% - 100%)    Physical Exam:  General: Adult female resting in the bed, in NAD  CVS: RRR, +S1/S2, no murmurs  Lungs: CTAB, no wheezing, rhonchi or rales  Abdomen: +BS, soft, mild RLQ tenderness, mild right CVA tenderness  Pelvic: Deferred  Ext: No edema or calf tenderness  Neuro: Normal DTRs, grossly intact    Labs:                        12.5   8.90  )-----------( 289      ( 20 Dec 2018 05:13 )             36.3         139  |  99  |  10  ----------------------------<  101<H>  3.6   |  19  |  0.6<L>    Ca    9.5      20 Dec 2018 05:13  Mg     2.1         TPro  7.6  /  Alb  4.4  /  TBili  0.4  /  DBili  x   /  AST  13  /  ALT  9   /  AlkPhos  42      Radiology:  < from: MR Abdomen No Cont (18 @ 14:25) >  EXAM:  MR ABDOMEN        PROCEDURE DATE:  2018    INTERPRETATION:  MRI abdomen without IV contrast  Indication: Abdominal pain. Pregnancy.  Technique: MRI abdomen without IV contrast performed. Multiplanar, multi   sequential T1, T2-weighted images of abdomen were obtained without IV   contrast administration.   Comparison: CT abdomen and pelvis 2017.  Findings:  Normal appendix (series 5, image [id number]; series 7, images 28-32).  Apparent increased T2 signal at right upper renal pole (series 7, image   41). No hydronephrosis.   Additional abdominal organs: Visualized liver, spleen, biliary system,   pancreas unremarkable.  Additional findings: Gravid uterus, incompletely evaluated on   nondedicated examination. Nonspecific mild free pelvic fluid. Normal bone   marrow signal. No lymphadenopathy. Left ovarian 4.1 cm cyst. Right ovary   not clearly delineated.  Impression:  1. Normal appendix.  2. Apparent signal abnormality at right upper renal pole; correlate for   pyelonephritis.  3. Left ovarian 4.1 cm cyst.  4. Gravid uterus, incompletely evaluated on nondedicated examination.  MARLIN RANDALL M.D., ATTENDING RADIOLOGIST  This document has been electronically signed. Dec [id number]  2:30PM    < end of copied text >    < from: US Kidney and Bladder (18 @ 14:00) >  EXAM:  US KIDNEYS AND BLADDER        PROCEDURE DATE:  2018    INTERPRETATION:  Clinical History / Reason for exam: Pyelonephritis,   pregnancy.  Comparison: CT abdomen and pelvis 2017  Procedure: Retroperitoneal ultrasound was performed.  Findings:  The right kidney is normal in size, location and echotexture measuring   11.9 cm in length.  There is no evidence of hydronephrosis, atrophy, mass   or calculus.  Normal vascular flow is demonstrated.  The left kidney is normal in size, location and echotexture measuring   11.6 cm in length.  There is no evidence of hydronephrosis, atrophy, mass   or calculus.  Normal vascular flow is demonstrated.    Urinary bladder is partially distended with a volume ofapproximately 81   mL.  No debris or calculus is seen. Bilateral ureteral jets are   visualized.   Impression:  Normal renal and bladder ultrasound.  RAGHAVENDRA MELGAR M.D., ATTENDING RADIOLOGIST  This document has been electronically signed. Dec 20 2018  2:20PM  < end of copied text >    < from: US Transvaginal (18 @ 07:00) >  EXAM:  US TRANSVAGINAL        PROCEDURE DATE:  2018    INTERPRETATION:  CLINICAL HISTORY: Right pelvic pain, pregnancy  COMPARISON: 2017  PROCEDURE: Transabdominal and transvaginal ultrasound of the pelvis was   performed, including Doppler.  LMP: 2018  FINDINGS:  UTERUS: Measures 8.8 x 6.3 x 4.8 cm. There is a single intrauterine   gestation with visualization of a yolk sac. Crown-rump length corresponds   to a gestational age of 5 weeks and 6 days; mean sac diameter corresponds   to a gestational age of 6 weeks and 3 days. Fetal heart rate is faintly   visualized although cannot be quantified. No evidence of subchorionic   hematoma. There is an intramural uterine fibroid measuring 1.5 cm.  RIGHT OVARY: measures 3.4 x 1.9 x 1.7 cm, and is unremarkable. Doppler   flow is demonstrated to the right ovary.   LEFT OVARY: measures 5.8 x 4.3 x 3.5 cm. There are multiple cysts in the   left adnexa including a simple cyst measuring up to 3.6 cm and a complex   cyst measuring up to 2.1 cm. Doppler flow is demonstrated to the left   ovary.   OTHER: There is small pelvic free fluid.  IMPRESSION:  Single live intrauterine gestation with a crown-rump length corresponding   to gestational age of 5 weeks and 6 days. Fetal heart rate is faintly   visualized although cannot be quantified, likely due to early gestation.  Multiple left adnexal cysts, including a 2.1 cm complex cyst.  Small pelvic free fluid.  Follow-up ultrasound is recommended.  RAGHAVENDRA MELGAR M.D., ATTENDING RADIOLOGIST  This document has been electronically signed. Dec 20 2018  8:59AM  < end of copied text >    MEDICATIONS  (STANDING):  cefTRIAXone   IVPB 1 Gram(s) IV Intermittent every 24 hours  lactated ringers. 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 She reports cramping lower abdominal pain, mild in nature, nonradiating, worse on the right side, starting yesterday afternoon, intermittent. She did not void at all yesterday, however she was able to void clear urine today in ED following administration of IV fluids. She denies dizziness, weakness, fevers, chills, shortness of breath, chest pain, dysuria, hematuria, vaginal bleeding, vaginal discharge, diarrhea, or constipation. This was an unplanned but desired pregnancy. She saw her obgyn Dr. Monterroso 3 days ago who confirmed her pregnancy. At that time she was only feeling mild nausea. She currently follow with a primary care physician and was told she no longer required any intervention for her history of rheumatic fever. (20 Dec 2018 11:55)    Currently the patient reports: she feels better, overall improved, abdominal and back pain improved, no longer feeling it. She ate a banana and no longer feels nauseated this morning. Denies chills, dysuria, hematuria, nausea, vomiting, chest pain, SOB, diarrhea, or constipation. Upon further discussion, the patient stated that she had felt this same right sided back pain prior to  where she saw her PMD and was given naproxen for 2 days and it went away.     Ob/Gyn History:  , ETOP x1           LMP -   2018                Cycle Length - regular, monthly  Denies history of ovarian cysts, uterine fibroids, abnormal paps, or STIs  Last Pap Smear - 1 week ago with Dr. Monterroso    PAST MEDICAL & SURGICAL HISTORY:  Rheumatic fever/heart disease  History of laparoscopic cholecystectomy    FAMILY HISTORY:  Mother: T2DM, ovarian and breast cancer, hypertension  Father: asthma, hypertension  Sister: T2DM    Social History: Denies ETOH, smoking and drugs.     Home medications: none    No Known Allergies    REVIEW OF SYSTEMS:  CONSTITUTIONAL: No weakness, fevers or chills  EYES/ENT: No visual changes;  No vertigo or throat pain   NECK: No pain or stiffness  RESPIRATORY: No cough, wheezing, hemoptysis; No shortness of breath  CARDIOVASCULAR: No chest pain or palpitations  GASTROINTESTINAL: No abdominal or epigastric pain. No nausea, vomiting, or hematemesis; No diarrhea or constipation. No melena or hematochezia.  GENITOURINARY: No dysuria, frequency or hematuria  NEUROLOGICAL: No numbness or weakness  SKIN: No itching, burning, rashes, or lesions   All other review of systems is negative unless indicated above.    Vital Signs:  Vital Signs Last 24 Hrs  T(C): 36.8 (21 Dec 2018 06:05), Max: 37.4 (20 Dec 2018 08:03)  T(F): 98.3 (21 Dec 2018 06:05), Max: 99.4 (20 Dec 2018 08:03)  HR: 63 (21 Dec 2018 06:05) (61 - 73)  BP: 108/62 (21 Dec 2018 06:05) (103/59 - 136/66)  RR: 18 (21 Dec 2018 06:05) (18 - 20)  SpO2: 100% (20 Dec 2018 11:53) (100% - 100%)    Physical Exam:  General: Adult female resting in the bed, in NAD  CVS: RRR, +S1/S2, no murmurs  Lungs: CTAB, no wheezing, rhonchi or rales  Abdomen: +BS, soft, mild RLQ tenderness, mild right CVA tenderness  Pelvic: Deferred  Ext: No edema or calf tenderness  Neuro: Normal DTRs, grossly intact    Labs:                        12.5   8.90  )-----------( 289      ( 20 Dec 2018 05:13 )             36.3         139  |  99  |  10  ----------------------------<  101<H>  3.6   |  19  |  0.6<L>    Ca    9.5      20 Dec 2018 05:13  Mg     2.1         TPro  7.6  /  Alb  4.4  /  TBili  0.4  /  DBili  x   /  AST  13  /  ALT  9   /  AlkPhos  42      Radiology:  < from: MR Abdomen No Cont (18 @ 14:25) >  EXAM:  MR ABDOMEN        PROCEDURE DATE:  2018    INTERPRETATION:  MRI abdomen without IV contrast  Indication: Abdominal pain. Pregnancy.  Technique: MRI abdomen without IV contrast performed. Multiplanar, multi   sequential T1, T2-weighted images of abdomen were obtained without IV   contrast administration.   Comparison: CT abdomen and pelvis 2017.  Findings:  Normal appendix (series 5, image [id number]; series 7, images 28-32).  Apparent increased T2 signal at right upper renal pole (series 7, image   41). No hydronephrosis.   Additional abdominal organs: Visualized liver, spleen, biliary system,   pancreas unremarkable.  Additional findings: Gravid uterus, incompletely evaluated on   nondedicated examination. Nonspecific mild free pelvic fluid. Normal bone   marrow signal. No lymphadenopathy. Left ovarian 4.1 cm cyst. Right ovary   not clearly delineated.  Impression:  1. Normal appendix.  2. Apparent signal abnormality at right upper renal pole; correlate for   pyelonephritis.  3. Left ovarian 4.1 cm cyst.  4. Gravid uterus, incompletely evaluated on nondedicated examination.  MARLIN RANDALL M.D., ATTENDING RADIOLOGIST  This document has been electronically signed. Dec [id number]  2:30PM    < end of copied text >    < from: US Kidney and Bladder (18 @ 14:00) >  EXAM:  US KIDNEYS AND BLADDER        PROCEDURE DATE:  2018    INTERPRETATION:  Clinical History / Reason for exam: Pyelonephritis,   pregnancy.  Comparison: CT abdomen and pelvis 2017  Procedure: Retroperitoneal ultrasound was performed.  Findings:  The right kidney is normal in size, location and echotexture measuring   11.9 cm in length.  There is no evidence of hydronephrosis, atrophy, mass   or calculus.  Normal vascular flow is demonstrated.  The left kidney is normal in size, location and echotexture measuring   11.6 cm in length.  There is no evidence of hydronephrosis, atrophy, mass   or calculus.  Normal vascular flow is demonstrated.    Urinary bladder is partially distended with a volume ofapproximately 81   mL.  No debris or calculus is seen. Bilateral ureteral jets are   visualized.   Impression:  Normal renal and bladder ultrasound.  RAGHAVENDRA MELGAR M.D., ATTENDING RADIOLOGIST  This document has been electronically signed. Dec 20 2018  2:20PM  < end of copied text >    < from: US Transvaginal (18 @ 07:00) >  EXAM:  US TRANSVAGINAL        PROCEDURE DATE:  2018    INTERPRETATION:  CLINICAL HISTORY: Right pelvic pain, pregnancy  COMPARISON: 2017  PROCEDURE: Transabdominal and transvaginal ultrasound of the pelvis was   performed, including Doppler.  LMP: 2018  FINDINGS:  UTERUS: Measures 8.8 x 6.3 x 4.8 cm. There is a single intrauterine   gestation with visualization of a yolk sac. Crown-rump length corresponds   to a gestational age of 5 weeks and 6 days; mean sac diameter corresponds   to a gestational age of 6 weeks and 3 days. Fetal heart rate is faintly   visualized although cannot be quantified. No evidence of subchorionic   hematoma. There is an intramural uterine fibroid measuring 1.5 cm.  RIGHT OVARY: measures 3.4 x 1.9 x 1.7 cm, and is unremarkable. Doppler   flow is demonstrated to the right ovary.   LEFT OVARY: measures 5.8 x 4.3 x 3.5 cm. There are multiple cysts in the   left adnexa including a simple cyst measuring up to 3.6 cm and a complex   cyst measuring up to 2.1 cm. Doppler flow is demonstrated to the left   ovary.   OTHER: There is small pelvic free fluid.  IMPRESSION:  Single live intrauterine gestation with a crown-rump length corresponding   to gestational age of 5 weeks and 6 days. Fetal heart rate is faintly   visualized although cannot be quantified, likely due to early gestation.  Multiple left adnexal cysts, including a 2.1 cm complex cyst.  Small pelvic free fluid.  Follow-up ultrasound is recommended.  RAGHAVENDRA MELGAR M.D., ATTENDING RADIOLOGIST  This document has been electronically signed. Dec 20 2018  8:59AM  < end of copied text >    MEDICATIONS  (STANDING):  cefTRIAXone   IVPB 1 Gram(s) IV Intermittent every 24 hours  lactated ringers. 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 She reports cramping lower abdominal pain, mild in nature, nonradiating, worse on the right side, starting yesterday afternoon, intermittent. She did not void at all yesterday, however she was able to void clear urine today in ED following administration of IV fluids. She denies dizziness, weakness, fevers, chills, shortness of breath, chest pain, dysuria, hematuria, vaginal bleeding, vaginal discharge, diarrhea, or constipation. This was an unplanned but desired pregnancy. She saw her obgyn Dr. Monterroso 3 days ago who confirmed her pregnancy. At that time she was only feeling mild nausea. She currently follow with a primary care physician and was told she no longer required any intervention for her history of rheumatic fever. (20 Dec 2018 11:55)    Currently the patient reports: she feels better, overall improved, abdominal and back pain improved, no longer feeling it. She ate a banana and no longer feels nauseated this morning. Denies chills, dysuria, hematuria, nausea, vomiting, chest pain, SOB, diarrhea, or constipation. Upon further discussion, the patient stated that she had felt this same right sided back pain prior to  where she saw her PMD and was given naproxen for 2 days and it went away.     Ob/Gyn History:  , ETOP x1           LMP -   2018                Cycle Length - regular, monthly  Denies history of ovarian cysts, uterine fibroids, abnormal paps, or STIs  Last Pap Smear - 1 week ago with Dr. Monterroso    PAST MEDICAL & SURGICAL HISTORY:  Rheumatic fever/heart disease  History of laparoscopic cholecystectomy    FAMILY HISTORY:  Mother: T2DM, ovarian and breast cancer, hypertension  Father: asthma, hypertension  Sister: T2DM    Social History: Denies ETOH, smoking and drugs.     Home medications: none    No Known Allergies    REVIEW OF SYSTEMS:  CONSTITUTIONAL: No weakness, fevers or chills  EYES/ENT: No visual changes;  No vertigo or throat pain   NECK: No pain or stiffness  RESPIRATORY: No cough, wheezing, hemoptysis; No shortness of breath  CARDIOVASCULAR: No chest pain or palpitations  GASTROINTESTINAL: No abdominal or epigastric pain. No hematemesis + nausea.  Positive constipation ; No diarrhea. No melena or hematochezia.  GENITOURINARY: No dysuria, frequency or hematuria  NEUROLOGICAL: No numbness or weakness  SKIN: No itching, burning, rashes, or lesions   All other review of systems is negative unless indicated above.    Vital Signs:  Vital Signs Last 24 Hrs  T(C): 36.8 (21 Dec 2018 06:05), Max: 37.4 (20 Dec 2018 08:03)  T(F): 98.3 (21 Dec 2018 06:05), Max: 99.4 (20 Dec 2018 08:03)  HR: 63 (21 Dec 2018 06:05) (61 - 73)  BP: 108/62 (21 Dec 2018 06:05) (103/59 - 136/66)  RR: 18 (21 Dec 2018 06:05) (18 - 20)  SpO2: 100% (20 Dec 2018 11:53) (100% - 100%)    Physical Exam:  General: Adult female resting in the bed, in NAD  CVS: RRR, +S1/S2, no murmurs  Lungs: CTAB, no wheezing, rhonchi or rales  Abdomen: +BS, soft, mild RLQ tenderness, mild right CVA tenderness  Pelvic: Deferred  Ext: No edema or calf tenderness  Neuro: Normal DTRs, grossly intact    Labs:                        12.5   8.90  )-----------( 289      ( 20 Dec 2018 05:13 )             36.3     -    139  |  99  |  10  ----------------------------<  101<H>  3.6   |  19  |  0.6<L>    Ca    9.5      20 Dec 2018 05:13  Mg     2.1         TPro  7.6  /  Alb  4.4  /  TBili  0.4  /  DBili  x   /  AST  13  /  ALT  9   /  AlkPhos  42  -    Urinalysis (.18 @ 11:12)    Glucose Qualitative, Urine: Negative    Blood, Urine: Small    pH Urine: 6.0    Color: Orange    Urine Appearance: Clear    Bilirubin: Small    Ketone - Urine: >=80    Specific Gravity: >=1.030    Protein, Urine: 30    Urobilinogen: 1.0    Nitrite: Negative    Leukocyte Esterase Concentration: Small        Radiology:  < from: MR Abdomen No Cont (18 @ 14:25) >  EXAM:  MR ABDOMEN        PROCEDURE DATE:  2018    INTERPRETATION:  MRI abdomen without IV contrast  Indication: Abdominal pain. Pregnancy.  Technique: MRI abdomen without IV contrast performed. Multiplanar, multi   sequential T1, T2-weighted images of abdomen were obtained without IV   contrast administration.   Comparison: CT abdomen and pelvis 2017.  Findings:  Normal appendix (series 5, image [id number]; series 7, images 28-32).  Apparent increased T2 signal at right upper renal pole (series 7, image   41). No hydronephrosis.   Additional abdominal organs: Visualized liver, spleen, biliary system,   pancreas unremarkable.  Additional findings: Gravid uterus, incompletely evaluated on   nondedicated examination. Nonspecific mild free pelvic fluid. Normal bone   marrow signal. No lymphadenopathy. Left ovarian 4.1 cm cyst. Right ovary   not clearly delineated.  Impression:  1. Normal appendix.  2. Apparent signal abnormality at right upper renal pole; correlate for   pyelonephritis.  3. Left ovarian 4.1 cm cyst.  4. Gravid uterus, incompletely evaluated on nondedicated examination.  MARLIN RANDALL M.D., ATTENDING RADIOLOGIST  This document has been electronically signed. Dec [id number]  2:30PM    < end of copied text >    < from: US Kidney and Bladder (18 @ 14:00) >  EXAM:  US KIDNEYS AND BLADDER        PROCEDURE DATE:  2018    INTERPRETATION:  Clinical History / Reason for exam: Pyelonephritis,   pregnancy.  Comparison: CT abdomen and pelvis 2017  Procedure: Retroperitoneal ultrasound was performed.  Findings:  The right kidney is normal in size, location and echotexture measuring   11.9 cm in length.  There is no evidence of hydronephrosis, atrophy, mass   or calculus.  Normal vascular flow is demonstrated.  The left kidney is normal in size, location and echotexture measuring   11.6 cm in length.  There is no evidence of hydronephrosis, atrophy, mass   or calculus.  Normal vascular flow is demonstrated.    Urinary bladder is partially distended with a volume ofapproximately 81   mL.  No debris or calculus is seen. Bilateral ureteral jets are   visualized.   Impression:  Normal renal and bladder ultrasound.  RAGHAVENDRA MELGAR M.D., ATTENDING RADIOLOGIST  This document has been electronically signed. Dec 20 2018  2:20PM  < end of copied text >    < from: US Transvaginal (18 @ 07:00) >  EXAM:  US TRANSVAGINAL        PROCEDURE DATE:  2018    INTERPRETATION:  CLINICAL HISTORY: Right pelvic pain, pregnancy  COMPARISON: 2017  PROCEDURE: Transabdominal and transvaginal ultrasound of the pelvis was   performed, including Doppler.  LMP: 2018  FINDINGS:  UTERUS: Measures 8.8 x 6.3 x 4.8 cm. There is a single intrauterine   gestation with visualization of a yolk sac. Crown-rump length corresponds   to a gestational age of 5 weeks and 6 days; mean sac diameter corresponds   to a gestational age of 6 weeks and 3 days. Fetal heart rate is faintly   visualized although cannot be quantified. No evidence of subchorionic   hematoma. There is an intramural uterine fibroid measuring 1.5 cm.  RIGHT OVARY: measures 3.4 x 1.9 x 1.7 cm, and is unremarkable. Doppler   flow is demonstrated to the right ovary.   LEFT OVARY: measures 5.8 x 4.3 x 3.5 cm. There are multiple cysts in the   left adnexa including a simple cyst measuring up to 3.6 cm and a complex   cyst measuring up to 2.1 cm. Doppler flow is demonstrated to the left   ovary.   OTHER: There is small pelvic free fluid.  IMPRESSION:  Single live intrauterine gestation with a crown-rump length corresponding   to gestational age of 5 weeks and 6 days. Fetal heart rate is faintly   visualized although cannot be quantified, likely due to early gestation.  Multiple left adnexal cysts, including a 2.1 cm complex cyst.  Small pelvic free fluid.  Follow-up ultrasound is recommended.  RAGHAVENDRA MELGAR M.D., ATTENDING RADIOLOGIST  This document has been electronically signed. Dec 20 2018  8:59AM  < end of copied text >    MEDICATIONS  (STANDING):  cefTRIAXone   IVPB 1 Gram(s) IV Intermittent every 24 hours  lactated ringers. 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 She reports cramping lower abdominal pain, mild in nature, nonradiating, worse on the right side, starting yesterday afternoon, intermittent. She did not void at all yesterday, however she was able to void clear urine today in ED following administration of IV fluids. She denies dizziness, weakness, fevers, chills, shortness of breath, chest pain, dysuria, hematuria, vaginal bleeding, vaginal discharge, diarrhea, or constipation. This was an unplanned but desired pregnancy. She saw her obgyn Dr. Monterroso 3 days ago who confirmed her pregnancy. At that time she was only feeling mild nausea. She currently follow with a primary care physician and was told she no longer required any intervention for her history of rheumatic fever. (20 Dec 2018 11:55)    Currently the patient reports: she feels better, overall improved, abdominal and back pain improved, no longer feeling it. She ate a banana and no longer feels nauseated this morning. Denies chills, dysuria, hematuria, nausea, vomiting, chest pain, SOB, diarrhea, or constipation. Upon further discussion, the patient stated that she had felt this same right sided back pain prior to  where she saw her PMD and was given naproxen for 2 days and it went away.     Ob/Gyn History:  , ETOP x1           LMP -   2018                Cycle Length - regular, monthly  Denies history of ovarian cysts, uterine fibroids, abnormal paps.  She states she did have STIs in the past.  Last Pap Smear - 1 week ago with Dr. Monterroso    PAST MEDICAL & SURGICAL HISTORY:  Rheumatic fever/heart disease  History of laparoscopic cholecystectomy    FAMILY HISTORY:  Mother: T2DM, ovarian and breast cancer, hypertension  Father: asthma, hypertension  Sister: T2DM    Social History: Denies ETOH, smoking and drugs.     Home medications: none    No Known Allergies    REVIEW OF SYSTEMS:  CONSTITUTIONAL: No weakness, fevers or chills  EYES/ENT: No visual changes;  No vertigo or throat pain   NECK: No pain or stiffness  RESPIRATORY: No cough, wheezing, hemoptysis; No shortness of breath  CARDIOVASCULAR: No chest pain or palpitations  GASTROINTESTINAL: No abdominal or epigastric pain. No hematemesis + nausea.  Positive constipation ; No diarrhea. No melena or hematochezia.  GENITOURINARY: No dysuria, frequency or hematuria  NEUROLOGICAL: No numbness or weakness  SKIN: No itching, burning, rashes, or lesions   All other review of systems is negative unless indicated above.    Vital Signs:  Vital Signs Last 24 Hrs  T(C): 36.8 (21 Dec 2018 06:05), Max: 37.4 (20 Dec 2018 08:03)  T(F): 98.3 (21 Dec 2018 06:05), Max: 99.4 (20 Dec 2018 08:03)  HR: 63 (21 Dec 2018 06:05) (61 - 73)  BP: 108/62 (21 Dec 2018 06:05) (103/59 - 136/66)  RR: 18 (21 Dec 2018 06:05) (18 - 20)  SpO2: 100% (20 Dec 2018 11:53) (100% - 100%)    Physical Exam:  General: Adult female resting in the bed, in NAD  CVS: RRR, +S1/S2, no murmurs  Lungs: CTAB, no wheezing, rhonchi or rales  Abdomen: +BS, soft, mild RLQ tenderness, mild right CVA tenderness  Pelvic: Deferred  Ext: No edema or calf tenderness  Neuro: Normal DTRs, grossly intact    Labs:                        12.5   8.90  )-----------( 289      ( 20 Dec 2018 05:13 )             36.3         139  |  99  |  10  ----------------------------<  101<H>  3.6   |  19  |  0.6<L>    Ca    9.5      20 Dec 2018 05:13  Mg     2.1         TPro  7.6  /  Alb  4.4  /  TBili  0.4  /  DBili  x   /  AST  13  /  ALT  9   /  AlkPhos  42      Urinalysis (18 @ 11:12)    Glucose Qualitative, Urine: Negative    Blood, Urine: Small    pH Urine: 6.0    Color: Orange    Urine Appearance: Clear    Bilirubin: Small    Ketone - Urine: >=80    Specific Gravity: >=1.030    Protein, Urine: 30    Urobilinogen: 1.0    Nitrite: Negative    Leukocyte Esterase Concentration: Small        Radiology:  < from: MR Abdomen No Cont (18 @ 14:25) >  EXAM:  MR ABDOMEN        PROCEDURE DATE:  2018    INTERPRETATION:  MRI abdomen without IV contrast  Indication: Abdominal pain. Pregnancy.  Technique: MRI abdomen without IV contrast performed. Multiplanar, multi   sequential T1, T2-weighted images of abdomen were obtained without IV   contrast administration.   Comparison: CT abdomen and pelvis 2017.  Findings:  Normal appendix (series 5, image [id number]; series 7, images 28-32).  Apparent increased T2 signal at right upper renal pole (series 7, image   41). No hydronephrosis.   Additional abdominal organs: Visualized liver, spleen, biliary system,   pancreas unremarkable.  Additional findings: Gravid uterus, incompletely evaluated on   nondedicated examination. Nonspecific mild free pelvic fluid. Normal bone   marrow signal. No lymphadenopathy. Left ovarian 4.1 cm cyst. Right ovary   not clearly delineated.  Impression:  1. Normal appendix.  2. Apparent signal abnormality at right upper renal pole; correlate for   pyelonephritis.  3. Left ovarian 4.1 cm cyst.  4. Gravid uterus, incompletely evaluated on nondedicated examination.  MARLIN RANDALL M.D., ATTENDING RADIOLOGIST  This document has been electronically signed. Dec [id number]  2:30PM    < end of copied text >    < from: US Kidney and Bladder (18 @ 14:00) >  EXAM:  US KIDNEYS AND BLADDER        PROCEDURE DATE:  2018    INTERPRETATION:  Clinical History / Reason for exam: Pyelonephritis,   pregnancy.  Comparison: CT abdomen and pelvis 2017  Procedure: Retroperitoneal ultrasound was performed.  Findings:  The right kidney is normal in size, location and echotexture measuring   11.9 cm in length.  There is no evidence of hydronephrosis, atrophy, mass   or calculus.  Normal vascular flow is demonstrated.  The left kidney is normal in size, location and echotexture measuring   11.6 cm in length.  There is no evidence of hydronephrosis, atrophy, mass   or calculus.  Normal vascular flow is demonstrated.    Urinary bladder is partially distended with a volume ofapproximately 81   mL.  No debris or calculus is seen. Bilateral ureteral jets are   visualized.   Impression:  Normal renal and bladder ultrasound.  RAGHAVENDRA MELAGR M.D., ATTENDING RADIOLOGIST  This document has been electronically signed. Dec 20 2018  2:20PM  < end of copied text >    < from: US Transvaginal (18 @ 07:00) >  EXAM:  US TRANSVAGINAL        PROCEDURE DATE:  2018    INTERPRETATION:  CLINICAL HISTORY: Right pelvic pain, pregnancy  COMPARISON: 2017  PROCEDURE: Transabdominal and transvaginal ultrasound of the pelvis was   performed, including Doppler.  LMP: 2018  FINDINGS:  UTERUS: Measures 8.8 x 6.3 x 4.8 cm. There is a single intrauterine   gestation with visualization of a yolk sac. Crown-rump length corresponds   to a gestational age of 5 weeks and 6 days; mean sac diameter corresponds   to a gestational age of 6 weeks and 3 days. Fetal heart rate is faintly   visualized although cannot be quantified. No evidence of subchorionic   hematoma. There is an intramural uterine fibroid measuring 1.5 cm.  RIGHT OVARY: measures 3.4 x 1.9 x 1.7 cm, and is unremarkable. Doppler   flow is demonstrated to the right ovary.   LEFT OVARY: measures 5.8 x 4.3 x 3.5 cm. There are multiple cysts in the   left adnexa including a simple cyst measuring up to 3.6 cm and a complex   cyst measuring up to 2.1 cm. Doppler flow is demonstrated to the left   ovary.   OTHER: There is small pelvic free fluid.  IMPRESSION:  Single live intrauterine gestation with a crown-rump length corresponding   to gestational age of 5 weeks and 6 days. Fetal heart rate is faintly   visualized although cannot be quantified, likely due to early gestation.  Multiple left adnexal cysts, including a 2.1 cm complex cyst.  Small pelvic free fluid.  Follow-up ultrasound is recommended.  RAGHAVENDRA MELGAR M.D., ATTENDING RADIOLOGIST  This document has been electronically signed. Dec 20 2018  8:59AM  < end of copied text >    MEDICATIONS  (STANDING):  cefTRIAXone   IVPB 1 Gram(s) IV Intermittent every 24 hours  lactated ringers. 1000 milliLiter(s) (125 mL/Hr) IV Continuous <Continuous>  prenatal multivitamin 1 Tablet(s) Oral daily    MEDICATIONS  (PRN):  ondansetron Injectable 4 milliGRAM(s) IV Push every 6 hours PRN Nausea and/or Vomiting

## 2018-12-21 NOTE — CONSULT NOTE ADULT - ATTENDING COMMENTS
[facility] Staff    I saw and evaluated Mery Fuentes with Dr. Leong and I agree with the documentation above.  Briefly, Ms. Ordonez is a 27 yo  at 5w6d with nausea, vomiting and right sided flank pain.  Her vital signs are stable, she is afebrile.  There is right sided CVA tenderness.  Abdominal exam in unremarkable.  WBC is 8.9.  Renal ultrasound was unremarkable and MRI of the abdomen was negative for appendicitis, although there was an abnormal signal of the upper right renal pole.  Urinalysis revealed smal leukocytes.  Urine culture is pending.  She [facility] Staff    I saw and evaluated Mery Fuentes with Dr. Leong and I agree with the documentation above.  Briefly, Ms. Ordonez is a 27 yo  at 5w6d with nausea, vomiting and right sided flank pain.  Her vital signs are stable, she is afebrile.  There is right sided CVA tenderness.  Abdominal exam is unremarkable.  WBC is 8.9.  Urinalysis revealed small leukocytes.  Urine culture is pending.  Renal ultrasound was unremarkable and MRI of the abdomen was negative for appendicitis, although there was an abnormal signal of the upper right renal pole.   She received Macrobid followed by ceftriaxone.     I question whether Mrey Fuentes really has pyelonephritis.  She has no fever and WBC is not elevated.  Urinalysis was ambiguous for infection.  urine culture is pending.  I would continue the ceftriaxone for now until the urine culture comes back.  If it is negative then I would treat with keflex for 7 days and have the primary Obstetrician do a test of cure in around two weeks. If it is positive I would treat based on the results of the urine culture.  If she develops fever or symptoms of a urinary tract infection she should return to the emergency room.  We are also repeating labs this morning.    Fetal heart tones should be rechecked in around two weeks (with hand held Doppler or ultrasound if necessary).  Ultrasound should be repeated in around 6 weeks to reevaluate ovaries and for Sequential screen if the patient desires.     She should also speak with her primary obstetrician regarding seeing a cancer genetic counselor given her mother's history of breast and ovarian cancer in her 40s.    She should have an early one hour GCT (to be ordered by the primary Obstetrician) given the family history of diabetes.    She should have an echocardiogram as a baseline (can be done as an outpatient) given her history of rheumatic fever.    Finally, she has irregular bowel movements (she stated her last bowel movement was last week), but this is a chronic issue.   I advised her to see a gastroenterologist.    Prenatal care will be with her primary Obstetrician.    Ms. Ordonez expressed understanding and all of her questions were answered to her satisfaction.  Thank you for this consult.    Jesus Perry MD

## 2018-12-21 NOTE — PROGRESS NOTE ADULT - SUBJECTIVE AND OBJECTIVE BOX
Progress Note: General Surgery  Patient: LIZETTE WIGGINS , 28y (1990)Female   MRN: [id number]  Location: [address]  Visit: 12-20-18 Inpatient  Date: 12-21-18 @ 05:29  Hospital Day: 2    Procedure/Diagnosis: R/o appendicitis  Events over 24h: No acute events overnight, vitals stable, afebrile    Vitals: T(F): 98.7 (12-20-18 @ 20:45), Max: 99.4 (12-20-18 @ 08:03)  HR: 64 (12-20-18 @ 20:45)  BP: 118/56 (12-20-18 @ 20:45) (103/59 - 136/66)  RR: 18 (12-20-18 @ 20:45)  SpO2: 100% (12-20-18 @ 11:53)    Diet: Diet, Regular (12-20-18 @ 14:20)    IV Fluids: yes, Type: lactated ringers. 1000 milliLiter(s) (125 mL/Hr) IV Continuous <Continuous>  prenatal multivitamin 1 Tablet(s) Oral daily    Physical Examination:  General Appearance: NAD, alert and cooperative  HEENT: NCAT, WNL  Heart: S1 and S2. No murmurs. RRR  Lungs: Clear to auscultation BL without rales, rhonchi, wheezing, crackles or diminished breath sounds.  Abdomen: Soft, nondistended      Medications: [Standing]  cefTRIAXone   IVPB 1 Gram(s) IV Intermittent every 24 hours  lactated ringers. 1000 milliLiter(s) (125 mL/Hr) IV Continuous <Continuous>  prenatal multivitamin 1 Tablet(s) Oral daily    DVT Prophylaxis:   GI Prophylaxis:   Antibiotics: cefTRIAXone   IVPB 1 Gram(s) IV Intermittent every 24 hours    Anticoagulation:   Medications:[PRN]  ondansetron Injectable 4 milliGRAM(s) IV Push every 6 hours PRN    Labs:                        12.5   8.90  )-----------( 289      ( 20 Dec 2018 05:13 )             36.3     12-20    139  |  99  |  10  ----------------------------<  101<H>  3.6   |  19  |  0.6<L>    Ca    9.5      20 Dec 2018 05:13  Mg     2.1     12-20    TPro  7.6  /  Alb  4.4  /  TBili  0.4  /  DBili  x   /  AST  13  /  ALT  9   /  AlkPhos  42  12-20    LIVER FUNCTIONS - ( 20 Dec 2018 05:13 )  Alb: 4.4 g/dL / Pro: 7.6 g/dL / ALK PHOS: 42 U/L / ALT: 9 U/L / AST: 13 U/L / GGT: x           Urine/Micro:    Urinalysis Basic - ( 20 Dec 2018 11:12 )    Color: Orange / Appearance: Clear / SG: >=1.030 / pH: x  Gluc: x / Ketone: >=80  / Bili: Small / Urobili: 1.0   Blood: x / Protein: 30 / Nitrite: Negative   Leuk Esterase: Small / RBC: 6-10 /HPF / WBC 6-10 /HPF   Sq Epi: x / Non Sq Epi: Few /HPF / Bacteria: Few /HPF      Imaging:  < from: MR Abdomen No Cont (12.20.18 @ 14:25) >  Impression:  1. Normal appendix.  2. Apparent signal abnormality at right upper renal pole; correlate for   pyelonephritis.  3. Left ovarian 4.1 cm cyst.  4. Gravid uterus, incompletely evaluated on nondedicated examination.    < end of copied text >

## 2018-12-22 ENCOUNTER — TRANSCRIPTION ENCOUNTER (OUTPATIENT)
Age: 28
End: 2018-12-22

## 2018-12-22 VITALS
HEART RATE: 69 BPM | TEMPERATURE: 98 F | DIASTOLIC BLOOD PRESSURE: 58 MMHG | SYSTOLIC BLOOD PRESSURE: 121 MMHG | RESPIRATION RATE: 18 BRPM

## 2018-12-22 RX ORDER — PYRIDOXINE HCL (VITAMIN B6) 100 MG
1 TABLET ORAL
Qty: 90 | Refills: 0
Start: 2018-12-22 | End: 2019-01-20

## 2018-12-22 RX ADMIN — Medication 1 TABLET(S): at 11:24

## 2018-12-22 RX ADMIN — CEFTRIAXONE 100 GRAM(S): 500 INJECTION, POWDER, FOR SOLUTION INTRAMUSCULAR; INTRAVENOUS at 10:09

## 2018-12-22 RX ADMIN — ONDANSETRON 4 MILLIGRAM(S): 8 TABLET, FILM COATED ORAL at 12:29

## 2018-12-22 RX ADMIN — ONDANSETRON 4 MILLIGRAM(S): 8 TABLET, FILM COATED ORAL at 05:19

## 2018-12-22 NOTE — DISCHARGE NOTE ADULT - PLAN OF CARE
healthy patient If you have fever or severe abdominal pain please call your doctor or come to the emergency room. Please take augmentin 875 mg twice a day for 10 days (prescription sent to pharmacy).

## 2018-12-22 NOTE — PROGRESS NOTE ADULT - SUBJECTIVE AND OBJECTIVE BOX
PGY2 progress note    Patient seen and examined at bedside. Reports she has only mild right lower back pain, much improved since admission. Has mild nausea but no vomiting. Denies fever, chest pain, SOB, abdominal pain, dysuria, diarrhea, vaginal bleeding.     PE:  Vital Signs Last 24 Hrs  T(C): 37.1 (22 Dec 2018 05:21), Max: 37.5 (21 Dec 2018 13:48)  T(F): 98.7 (22 Dec 2018 05:21), Max: 99.5 (21 Dec 2018 13:48)  HR: 73 (22 Dec 2018 05:21) (60 - 73)  BP: 112/54 (22 Dec 2018 05:21) (109/54 - 120/90)  RR: 18 (22 Dec 2018 05:21) (18 - 18)    GEN: NAD, AAOX3  CVS: RRR, normal S1/S2  lungs: CTAB  abd: soft, nontender, no r/g/r, no CVA tenderness bialterally  ext: no calf tenderness or edema    labs:                        11.3   6.48  )-----------( 272      ( 21 Dec 2018 09:04 )             33.4   12-21    138  |  98  |  5<L>  ----------------------------<  100<H>  3.4<L>   |  22  |  0.6<L>    Ca    8.9      21 Dec 2018 09:04    12/20 Ucx catheterized (obtained after dose of macrobid in the ED and while rocephin was administered IV) final no growth    Imaging:    MRI abdomen:  Impression:  1. Normal appendix.  2. Apparent signal abnormality at right upper renal pole; correlate for   pyelonephritis.  3. Left ovarian 4.1 cm cyst.  4. Gravid uterus, incompletely evaluated on nondedicated examination.    retroperitoneal sono:  Normal renal and bladder ultrasound. PGY2 progress note    Patient seen and examined at bedside. Reports she has only mild right lower back pain, much improved since admission. Has mild nausea but no vomiting. Denies fever, chest pain, SOB, abdominal pain, dysuria, diarrhea, vaginal bleeding.     PE:  Vital Signs Last 24 Hrs  T(C): 37.1 (22 Dec 2018 05:21), Max: 37.5 (21 Dec 2018 13:48)  T(F): 98.7 (22 Dec 2018 05:21), Max: 99.5 (21 Dec 2018 13:48)  HR: 73 (22 Dec 2018 05:21) (60 - 73)  BP: 112/54 (22 Dec 2018 05:21) (109/54 - 120/90)  RR: 18 (22 Dec 2018 05:21) (18 - 18)    GEN: NAD, AAOX3  CVS: RRR, normal S1/S2  lungs: CTAB  abd: soft, nontender, no r/g/r, no CVA tenderness bialterally  ext: no calf tenderness or edema    meds:  MEDICATIONS  (STANDING):  cefTRIAXone   IVPB 1 Gram(s) IV Intermittent every 24 hours  prenatal multivitamin 1 Tablet(s) Oral daily    labs:                        11.3   6.48  )-----------( 272      ( 21 Dec 2018 09:04 )             33.4   12-21    138  |  98  |  5<L>  ----------------------------<  100<H>  3.4<L>   |  22  |  0.6<L>    Ca    8.9      21 Dec 2018 09:04    12/20 Ucx catheterized (obtained after dose of macrobid in the ED and while rocephin was administered IV) final no growth    Imaging:    MRI abdomen:  Impression:  1. Normal appendix.  2. Apparent signal abnormality at right upper renal pole; correlate for   pyelonephritis.  3. Left ovarian 4.1 cm cyst.  4. Gravid uterus, incompletely evaluated on nondedicated examination.    retroperitoneal sono:  Normal renal and bladder ultrasound. PGY2 progress note    Patient seen and examined at bedside. Reports she has only mild right lower back pain, much improved since admission. Has mild nausea but no vomiting. Denies fever, chest pain, SOB, abdominal pain, dysuria, diarrhea, vaginal bleeding.     PE:  Vital Signs Last 24 Hrs  T(C): 37.1 (22 Dec 2018 05:21), Max: 37.5 (21 Dec 2018 13:48)  T(F): 98.7 (22 Dec 2018 05:21), Max: 99.5 (21 Dec 2018 13:48)  HR: 73 (22 Dec 2018 05:21) (60 - 73)  BP: 112/54 (22 Dec 2018 05:21) (109/54 - 120/90)  RR: 18 (22 Dec 2018 05:21) (18 - 18)    GEN: NAD, AAOX3  CVS: RRR, normal S1/S2  lungs: CTAB  abd: soft, nontender, no r/g/r, no CVA tenderness bialterally  ext: no calf tenderness or edema    meds:  MEDICATIONS  (STANDING):  cefTRIAXone   IVPB 1 Gram(s) IV Intermittent every 24 hours  prenatal multivitamin 1 Tablet(s) Oral daily    labs:                        11.3   6.48  )-----------( 272      ( 21 Dec 2018 09:04 )             33.4   12-    138  |  98  |  5<L>  ----------------------------<  100<H>  3.4<L>   |  22  |  0.6<L>    Ca    8.9      21 Dec 2018 09:04     Ucx catheterized (obtained after dose of macrobid in the ED and while rocephin was administered IV) final no growth    Imagin/20/18 MRI abdomen:  Impression:  1. Normal appendix.  2. Apparent signal abnormality at right upper renal pole; correlate for   pyelonephritis.  3. Left ovarian 4.1 cm cyst.  4. Gravid uterus, incompletely evaluated on nondedicated examination.    18 retroperitoneal sono:  Normal renal and bladder ultrasound.

## 2018-12-22 NOTE — DISCHARGE NOTE ADULT - CARE PROVIDER_API CALL
Emory Monterroso), Obstetrics and Gynecology  [address] [place] [id number]  Phone: [phone]  Fax: [phone]

## 2018-12-22 NOTE — DISCHARGE NOTE ADULT - CARE PLAN
Principal Discharge DX:	Pyelonephritis  Goal:	healthy patient  Assessment and plan of treatment:	If you have fever or severe abdominal pain please call your doctor or come to the emergency room. Please take augmentin 875 mg twice a day for 10 days (prescription sent to pharmacy).

## 2018-12-22 NOTE — DISCHARGE NOTE ADULT - PATIENT PORTAL LINK FT
You can access the [facility] Patient Portal, offered by [facility], by registering with the following website: [email or web address]

## 2018-12-22 NOTE — DISCHARGE NOTE ADULT - HOSPITAL COURSE
HPI:  Chief Complaint: Nausea, vomiting, and abdominal pain    HPI: 29 yo  at 5w6d GA by LMP of 2018 confirmed by ED thong today, h/o rheumatic fever with valve defect at age 12 s/p prophylactic antibiotics from age 12 to age 19 presents for nausea and vomiting for 3 days, worsening, several times per day, nonbloody, nonbilious, associated with inability to tolerate PO for 2 days. She reports cramping lower abdominal pain, mild in nature, nonradiating, worse on the right side, starting yesterday afternoon, intermittent. She did not void at all yesterday, however she was able to void clear urine today in ED following administration of IV fluids. She denies dizziness, weakness, fevers, chills, shortness of breath, chest pain, dysuria, hematuria, vaginal bleeding, vaginal discharge, diarrhea, or constipation. This was an unplanned but desired pregnancy.    She saw her obgyn Dr. Monterroso 3 days ago who confirmed her pregnancy. At that time she was only feeling mild nausea.    She currently follow with a primary care physician and was told she no longer required any intervention for her history of rheumatic fever.    Ob/Gyn History:                   LMP -   2018                Cycle Length - regular, monthly  Denies history of ovarian cysts, uterine fibroids, abnormal paps, or STIs  Last Pap Smear - 1 week ago. (20 Dec 2018 11:55)    Patient admitted to the hospital for clinical pyelonephritis and was given IV antibiotic treatment with rocephin. In her hospital course remained afebrile, Initially had right CVA tenderness which then resolved. WBC was normal. Ucx (straight cath) was negative but obtained after a dose of macrobid in the ED and while the first dose of rocephin was running. Per MFM recommendations discharged home with PO augmentin for 10 days. Patient to f/u with PMD in 1 week.

## 2018-12-22 NOTE — DISCHARGE NOTE ADULT - MEDICATION SUMMARY - MEDICATIONS TO TAKE
I will START or STAY ON the medications listed below when I get home from the hospital:    Prenatal Multivitamins with Folic Acid 1 mg oral tablet  -- 1 tab(s) by mouth once a day  -- Indication: For Pregnancy    Augmentin 875 mg-125 mg oral tablet  -- 1 tab(s) by mouth every 12 hours   -- Finish all this medication unless otherwise directed by prescriber.  Take with food or milk.    -- Indication: For Pyelonephritis

## 2018-12-22 NOTE — DISCHARGE NOTE ADULT - ADDITIONAL INSTRUCTIONS
If you have a temperature above 100.4F, excessive vaginal bleeding or severe abdominal pain please call your doctor or come to the emergency department.  Please follow up with Dr. Monterroso in 1 week for a follow up visit.  [facility] recommendations:  - monthly urine cultures for the remainder of the pregnancy  - genetic counselor due to history of maternal breast cancer and ovarian cancer  - early GCT due to FHx of Diabetes  - ECHO given history of rheumatic fever If you have a temperature above 100.4F, excessive vaginal bleeding or severe abdominal pain please call your doctor or come to the emergency department.  Please follow up with Dr. Monterroso in 1 week for a follow up visit.  [facility] recommendations:  - Ucx for trial of cure for in 2-3 weeks  - monthly urine cultures for the remainder of the pregnancy  - genetic counselor due to history of maternal breast cancer and ovarian cancer  - early GCT due to FHx of Diabetes  - ECHO given history of rheumatic fever

## 2018-12-22 NOTE — PROGRESS NOTE ADULT - ASSESSMENT
27 y/o  at 7w0d GA, h/o rheumatic fever with valvular defect, with clinical pyelonephritis, Ucx negative but obtained after antibiotics, afebrile and clinically improving, doing well.   - 29 y/o  at 7w0d GA, h/o rheumatic fever with valvular defect, with clinical pyelonephritis vs musculoskeletal pain, Ucx negative but obtained after antibiotics, afebrile and clinically improving, benign physical exam, doing well.   - pt to receive third dose of ceftriaxone this morning  - f/u temps  - regular diet  - d/c home with augmentin 875 mg BID for 10 days   - will need suppression therapy for the remainder of pregnancy (keflex 250-500 mg at bedtime)  - RASHEEDA Ucx in 2-3 weeks with PMD  - f/u with PMD for routine prenatal care    Dr. Perez aware. To be discussed with Dr. Danielson and MFM attending. 29 y/o  at 7w0d GA, h/o rheumatic fever with valvular defect, with clinical pyelonephritis vs musculoskeletal pain, Ucx negative but obtained after antibiotics, afebrile and clinically improving, benign physical exam, doing well.   - pt to receive third dose of ceftriaxone this morning  - d/c home with augmentin 875 mg BID for 10 days  - RASHEEDA Ucx in 2-3 weeks with PMD  - Ucx monthly for the remainder of the pregnancy  - f/u with PMD for routine prenatal care    Dr. Perez and Dr. Echeverria (MFM attending) aware. To be discussed with Dr. Danielson (gyn attending). 27 y/o  at 6w0d by LMP (18), h/o rheumatic fever with valvular defect, with clinical pyelonephritis vs musculoskeletal pain, Ucx negative but obtained after antibiotics, afebrile and clinically improving, benign physical exam, doing well.   - pt to receive third dose of ceftriaxone this morning  - d/c home with augmentin 875 mg BID for 10 days  - RASHEEDA Ucx in 2-3 weeks with PMD  - Ucx monthly for the remainder of the pregnancy  - f/u with PMD for routine prenatal care    Dr. Perez and Dr. Echeverria (MFM attending) aware. To be discussed with Dr. Danielson (gyn attending). 29 y/o  at 6w0d by LMP (18), h/o rheumatic fever with valvular defect, with clinical pyelonephritis vs musculoskeletal pain, Ucx negative but obtained after antibiotics, afebrile and clinically improving, benign physical exam, doing well.   - pt to receive third dose of ceftriaxone this morning  - d/c home with augmentin 875 mg BID for 10 days  - RASHEEDA Ucx in 2-3 weeks with PMD  - Ucx monthly for the remainder of the pregnancy  - [facility] recommendations: sequential 1 with sono in 6 weeks, genetic counselor due to history of maternal breast cancer and ovarian cancer, early GCT due to FHx of Diabetes, maternal ECHO given history of rheumatic fever  - f/u with PMD for routine prenatal care    Dr. Perez and Dr. Echeverria (MFM attending) aware. To be discussed with Dr. Danielson (gyn attending).

## 2018-12-27 DIAGNOSIS — O23.01 INFECTIONS OF KIDNEY IN PREGNANCY, FIRST TRIMESTER: ICD-10-CM

## 2018-12-27 DIAGNOSIS — N83.202 UNSPECIFIED OVARIAN CYST, LEFT SIDE: ICD-10-CM

## 2018-12-27 DIAGNOSIS — R10.9 UNSPECIFIED ABDOMINAL PAIN: ICD-10-CM

## 2018-12-27 DIAGNOSIS — O34.81 MATERNAL CARE FOR OTHER ABNORMALITIES OF PELVIC ORGANS, FIRST TRIMESTER: ICD-10-CM

## 2018-12-27 DIAGNOSIS — Z3A.01 LESS THAN 8 WEEKS GESTATION OF PREGNANCY: ICD-10-CM

## 2019-07-19 ENCOUNTER — OUTPATIENT (OUTPATIENT)
Dept: OUTPATIENT SERVICES | Facility: HOSPITAL | Age: 29
LOS: 1 days | Discharge: HOME | End: 2019-07-19
Payer: MEDICAID

## 2019-07-19 VITALS — DIASTOLIC BLOOD PRESSURE: 58 MMHG | SYSTOLIC BLOOD PRESSURE: 116 MMHG | HEART RATE: 71 BPM

## 2019-07-19 VITALS — TEMPERATURE: 98 F

## 2019-07-19 DIAGNOSIS — Z90.49 ACQUIRED ABSENCE OF OTHER SPECIFIED PARTS OF DIGESTIVE TRACT: Chronic | ICD-10-CM

## 2019-07-19 PROBLEM — I09.9 RHEUMATIC HEART DISEASE, UNSPECIFIED: Chronic | Status: ACTIVE | Noted: 2018-12-20

## 2019-07-19 PROCEDURE — 59025 FETAL NON-STRESS TEST: CPT | Mod: 26

## 2019-07-19 PROCEDURE — 99213 OFFICE O/P EST LOW 20 MIN: CPT | Mod: 25

## 2019-07-19 RX ORDER — ACETAMINOPHEN 500 MG
650 TABLET ORAL EVERY 6 HOURS
Refills: 0 | Status: DISCONTINUED | OUTPATIENT
Start: 2019-07-19 | End: 2019-08-09

## 2019-07-19 NOTE — OB PROVIDER TRIAGE NOTE - HISTORY OF PRESENT ILLNESS
30yo  at 36w with SKYE 19 dated by LMP 18 c/w first trimesters sonogram, presents to labor and delivery for abdominal pain, that started at 630AM, 8/10 in intensity, intermittent occurring every few min, mostly generalized described as sharp but crampy at times, highest intensity in the left upper quadrant radiating to the back. Patient also describes watery, non-bloody stools for 4 days 5times per day, relieved by imodium, taken yesterday at 4pm. Reports intermittent vomiting in this pregnancy, last episode yesterday, able to tolerate PO intake today. Denies leakage of fluid or vaginal bleeding. Good fetal movements. Denies fevers, chills, chest pain, SOB, dysuria, other urinary symptoms. Denies sick contacts or recent travels. Last PO intake this AM. Last intercourse 3 months ago. Last BM yesterday at 4pm. Patient was hospitalized around 6w for pyelonephritis, urine culture showed no growth at the time on .  Denies any complications in this pregnancy    Allergies nkda  Medications none  Social: Denies smoking, alcohol or illicit drug use 28yo  at 36w with SKYE 19 dated by LMP 18 c/w first trimesters sonogram, presents to labor and delivery for abdominal pain, that started at 630AM, 8/10 in intensity, intermittent occurring every few min, mostly generalized described as sharp but crampy at times, highest intensity in the left upper quadrant radiating to the back. Patient also describes watery, non-bloody stools for 4 days 5times per day, relieved by imodium, taken yesterday at 4pm. Reports intermittent vomiting in this pregnancy, last episode yesterday, able to tolerate PO intake today. Denies leakage of fluid or vaginal bleeding. Good fetal movements. Denies fevers, chills, chest pain, SOB, dysuria, other urinary symptoms. Denies sick contacts or recent travels. Last PO intake this AM. Last intercourse 3 months ago. Last BM yesterday at 4pm. Patient was hospitalized around 6w for pyelonephritis, urine culture showed no growth at the time on .  Denies any complications in this pregnancy. GBS unknown    Allergies nkda  Medications none  Social: Denies smoking, alcohol or illicit drug use

## 2019-07-19 NOTE — OB PROVIDER TRIAGE NOTE - NSHPPHYSICALEXAM_GEN_ALL_CORE
Physical exam:    Vital Signs Last 24 Hrs  T(F): 98.24 (19 Jul 2019 08:56), Max: 98.24 (19 Jul 2019 08:56)  HR: 72 (19 Jul 2019 08:45) (71 - 72)  BP: 128/70 (19 Jul 2019 08:45) (116/58 - 128/70)  RR: 22 (19 Jul 2019 08:25) (22 - 22)  Udip negative  Gen: NAD  Abdomen: Soft, nontender, gravid. no palpable contractions. generalized sensitivity to touch. no rebound, guarding or rigidity.  Ext: No calf tenderness  Speculum: cervix appears closed. No bleeding or blood noted. No lesions seen.  VE: FT/0/-2 vertex by sono, intact  EFM: 140/mod/+accels  toco: no contractions

## 2019-07-19 NOTE — OB PROVIDER TRIAGE NOTE - NSOBPROVIDERNOTE_OBGYN_ALL_OB_FT
28yo  at 36w dated by LMP, GBS unknown, with abdominal pain, without contractions, likely GI in origin, Udip negative    -Tylenol  -PO hydration encouraged 28yo  at 36w dated by LMP, GBS unknown, with abdominal pain, without contractions, likely GI in origin, Udip negative    -Tylenol 600 [id number]  -PO hydration encouraged  -precautions given  -F/u with Dr. Monterroso at the office this week    Dr. Gates and Dr. Alarcon aware

## 2019-07-19 NOTE — OB PROVIDER TRIAGE NOTE - ADDITIONAL INSTRUCTIONS
- hydrate, hydrate, hydrate  - [place] diet   - Simethicone  - f/u with Dr. Monterroso in the office this week

## 2019-07-25 DIAGNOSIS — O26.893 OTHER SPECIFIED PREGNANCY RELATED CONDITIONS, THIRD TRIMESTER: ICD-10-CM

## 2019-07-25 DIAGNOSIS — O47.1 FALSE LABOR AT OR AFTER 37 COMPLETED WEEKS OF GESTATION: ICD-10-CM

## 2019-12-02 ENCOUNTER — EMERGENCY (EMERGENCY)
Facility: HOSPITAL | Age: 29
LOS: 0 days | Discharge: HOME | End: 2019-12-02
Attending: EMERGENCY MEDICINE | Admitting: EMERGENCY MEDICINE
Payer: MEDICAID

## 2019-12-02 VITALS
OXYGEN SATURATION: 98 % | HEART RATE: 107 BPM | RESPIRATION RATE: 18 BRPM | TEMPERATURE: 100 F | DIASTOLIC BLOOD PRESSURE: 68 MMHG | SYSTOLIC BLOOD PRESSURE: 150 MMHG
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
## 2019-12-02 DIAGNOSIS — S01.511A LACERATION WITHOUT FOREIGN BODY OF LIP, INITIAL ENCOUNTER: ICD-10-CM

## 2019-12-02 DIAGNOSIS — Z90.49 ACQUIRED ABSENCE OF OTHER SPECIFIED PARTS OF DIGESTIVE TRACT: Chronic | ICD-10-CM
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
## 2019-12-02 PROCEDURE — 99284 EMERGENCY DEPT VISIT MOD MDM: CPT | Mod: 25

## 2019-12-02 PROCEDURE — 12011 RPR F/E/E/N/L/M 2.5 CM/<: CPT

## 2019-12-02 RX ORDER — FENTANYL CITRATE 50 UG/ML
100 INJECTION INTRAVENOUS ONCE
Refills: 0 | Status: DISCONTINUED | OUTPATIENT
Start: 2019-12-02 | End: 2019-12-02

## 2019-12-02 RX ADMIN — FENTANYL CITRATE 100 MICROGRAM(S): 50 INJECTION INTRAVENOUS at 21:10

## 2019-12-02 NOTE — ED PROVIDER NOTE - OBJECTIVE STATEMENT
30 yo female with no pmhx presenting after fall. Pt had a mechanical fall over cables while walking and hit L upper lip on countertop. Complains of L sided lip pain worse with talking. Denies head injury, LOC, weakness, numbness, tingling, chest pain, SOB, abdominal pain.

## 2019-12-02 NOTE — ED PROCEDURE NOTE - LENGTH OF LACERATION
Form received back signed  3/6/18  Faxed Back & Sent to be scanned to this encounter  Leilani [place] Sec           2

## 2019-12-02 NOTE — ED PROVIDER NOTE - PATIENT PORTAL LINK FT
You can access the FollowMyHealth Patient Portal offered by [facility] by registering at the following website: [email or web address]. By joining [facility]’s FollowMyHealth portal, you will also be able to view your health information using other applications (apps) compatible with our system.

## 2019-12-02 NOTE — ED PROVIDER NOTE - NS ED ROS FT
Constitutional:  see HPI  Head:  no headache, dizziness, loss of consciousness  Eyes:  no visual changes; no eye pain, redness, or discharge  ENMT:  no ear pain or discharge; no hearing problems; no mouth or throat sores or lesions; no throat pain  Cardiac: no chest pain, tachycardia or palpitations  Respiratory: no cough, wheezing, shortness of breath, chest tightness, or trouble breathing  GI: no nausea, vomiting, diarrhea or abdominal pain  :  no dysuria, frequency, or burning with urination; no change in urine output  MS: no myalgias, muscle weakness, joint pain,or  injury; no joint swelling  Neuro: no weakness; no numbness or tingling; no seizure  Skin: L upper lip laceration

## 2019-12-02 NOTE — ED ADULT NURSE NOTE - NSIMPLEMENTINTERV_GEN_ALL_ED
Implemented All Universal Safety Interventions:  [place] to call system. Call bell, personal items and telephone within reach. Instruct patient to call for assistance. Room bathroom lighting operational. Non-slip footwear when patient is off stretcher. Physically safe environment: no spills, clutter or unnecessary equipment. Stretcher in lowest position, wheels locked, appropriate side rails in place.

## 2019-12-02 NOTE — ED PROVIDER NOTE - PHYSICAL EXAMINATION
CONSTITUTIONAL: Well-developed; well-nourished; in no acute distress.   SKIN: 1.5 cm L upper lip laceration through vermillion border  HEAD: Normocephalic; atraumatic.  EYES: PERRL, EOMI, normal sclera and conjunctiva   ENT: No nasal discharge; airway clear.  NECK: Supple; non tender.  CARD: S1, S2 normal; no murmurs, gallops, or rubs. Regular rate and rhythm.   RESP: No wheezes, rales or rhonchi.  ABD: soft ntnd  EXT: Normal ROM.  No clubbing, cyanosis or edema.   LYMPH: No acute cervical adenopathy.  NEURO: Alert, oriented, grossly unremarkable  PSYCH: Cooperative, appropriate.

## 2020-02-19 ENCOUNTER — EMERGENCY (EMERGENCY)
Facility: HOSPITAL | Age: 30
LOS: 0 days | Discharge: HOME | End: 2020-02-19
Admitting: EMERGENCY MEDICINE
Payer: COMMERCIAL

## 2020-02-19 VITALS
DIASTOLIC BLOOD PRESSURE: 91 MMHG | RESPIRATION RATE: 18 BRPM | HEART RATE: 87 BPM | OXYGEN SATURATION: 100 % | TEMPERATURE: 98 F | SYSTOLIC BLOOD PRESSURE: 130 MMHG

## 2020-02-19 VITALS — WEIGHT: 229.94 LBS
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
## 2020-02-19 DIAGNOSIS — Z90.49 ACQUIRED ABSENCE OF OTHER SPECIFIED PARTS OF DIGESTIVE TRACT: Chronic | ICD-10-CM

## 2020-02-19 DIAGNOSIS — S01.81XA LACERATION WITHOUT FOREIGN BODY OF OTHER PART OF HEAD, INITIAL ENCOUNTER: ICD-10-CM
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
## 2020-02-19 PROCEDURE — 12011 RPR F/E/E/N/L/M 2.5 CM/<: CPT

## 2020-02-19 PROCEDURE — 99283 EMERGENCY DEPT VISIT LOW MDM: CPT | Mod: 25

## 2020-02-19 RX ORDER — IBUPROFEN 200 MG
600 TABLET ORAL ONCE
Refills: 0 | Status: COMPLETED | OUTPATIENT
Start: 2020-02-19 | End: 2020-02-19

## 2020-02-19 RX ADMIN — Medication 600 MILLIGRAM(S): at 14:10
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
## 2020-02-19 NOTE — ED PROVIDER NOTE - PATIENT PORTAL LINK FT
You can access the FollowMyHealth Patient Portal offered by [facility] by registering at the following website: [email or web address]. By joining [employer]’s FollowMyHealth portal, you will also be able to view your health information using other applications (apps) compatible with our system.
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
## 2020-02-19 NOTE — ED PROVIDER NOTE - PHYSICAL EXAMINATION
CONST: Well appearing in NAD  EYES: PERRL, EOMI, Sclera and conjunctiva clear.   ENT: No nasal discharge. TM's clear B/L without drainage. Oropharynx normal appearing, no erythema or exudates. Uvula midline.  NECK: Non-tender, no meningeal signs  CARD: Normal S1 S2; Normal rate and rhythm  RESP: Equal BS B/L, No wheezes, rhonchi or rales. No distress  GI: Soft, non-tender, non-distended.  MS: Normal ROM in all extremities. No midline spinal tenderness.  SKIN: Warm, dry, no acute rashes. Good turgor 1.5 cm linear lac to right chin. No Fb or NV injury  NEURO: A&Ox3, No focal deficits. Strength 5/5 with no sensory deficits. Steady gait

## 2020-08-09 ENCOUNTER — EMERGENCY (EMERGENCY)
Facility: HOSPITAL | Age: 30
LOS: 0 days | Discharge: HOME | End: 2020-08-09
Attending: EMERGENCY MEDICINE | Admitting: EMERGENCY MEDICINE
Payer: COMMERCIAL

## 2020-08-09 VITALS
OXYGEN SATURATION: 99 % | DIASTOLIC BLOOD PRESSURE: 69 MMHG | TEMPERATURE: 98 F | WEIGHT: 229.94 LBS | SYSTOLIC BLOOD PRESSURE: 111 MMHG | RESPIRATION RATE: 18 BRPM | HEART RATE: 64 BPM

## 2020-08-09 DIAGNOSIS — R07.9 CHEST PAIN, UNSPECIFIED: ICD-10-CM

## 2020-08-09 DIAGNOSIS — R07.89 OTHER CHEST PAIN: ICD-10-CM

## 2020-08-09 DIAGNOSIS — Z90.49 ACQUIRED ABSENCE OF OTHER SPECIFIED PARTS OF DIGESTIVE TRACT: Chronic | ICD-10-CM

## 2020-08-09 LAB
ALBUMIN SERPL ELPH-MCNC: 4.3 G/DL — SIGNIFICANT CHANGE UP (ref 3.5–5.2)
ALP SERPL-CCNC: 40 U/L — SIGNIFICANT CHANGE UP (ref 30–115)
ALT FLD-CCNC: 21 U/L — SIGNIFICANT CHANGE UP (ref 0–41)
ANION GAP SERPL CALC-SCNC: 11 MMOL/L — SIGNIFICANT CHANGE UP (ref 7–14)
APPEARANCE UR: CLEAR — SIGNIFICANT CHANGE UP
AST SERPL-CCNC: 14 U/L — SIGNIFICANT CHANGE UP (ref 0–41)
BACTERIA # UR AUTO: NEGATIVE — SIGNIFICANT CHANGE UP
BASOPHILS # BLD AUTO: 0.03 K/UL — SIGNIFICANT CHANGE UP (ref 0–0.2)
BASOPHILS NFR BLD AUTO: 0.7 % — SIGNIFICANT CHANGE UP (ref 0–1)
BILIRUB SERPL-MCNC: 0.6 MG/DL — SIGNIFICANT CHANGE UP (ref 0.2–1.2)
BILIRUB UR-MCNC: NEGATIVE — SIGNIFICANT CHANGE UP
BUN SERPL-MCNC: 8 MG/DL — LOW (ref 10–20)
CALCIUM SERPL-MCNC: 9.3 MG/DL — SIGNIFICANT CHANGE UP (ref 8.5–10.1)
CHLORIDE SERPL-SCNC: 108 MMOL/L — SIGNIFICANT CHANGE UP (ref 98–110)
CO2 SERPL-SCNC: 22 MMOL/L — SIGNIFICANT CHANGE UP (ref 17–32)
COLOR SPEC: YELLOW — SIGNIFICANT CHANGE UP
CREAT SERPL-MCNC: 0.8 MG/DL — SIGNIFICANT CHANGE UP (ref 0.7–1.5)
D DIMER BLD IA.RAPID-MCNC: 95 NG/ML DDU — SIGNIFICANT CHANGE UP (ref 0–230)
DIFF PNL FLD: ABNORMAL
EOSINOPHIL # BLD AUTO: 0.2 K/UL — SIGNIFICANT CHANGE UP (ref 0–0.7)
EOSINOPHIL NFR BLD AUTO: 4.5 % — SIGNIFICANT CHANGE UP (ref 0–8)
EPI CELLS # UR: 6 /HPF — HIGH (ref 0–5)
GLUCOSE SERPL-MCNC: 101 MG/DL — HIGH (ref 70–99)
GLUCOSE UR QL: NEGATIVE — SIGNIFICANT CHANGE UP
HCT VFR BLD CALC: 35.2 % — LOW (ref 37–47)
HGB BLD-MCNC: 11.3 G/DL — LOW (ref 12–16)
HYALINE CASTS # UR AUTO: 5 /LPF — SIGNIFICANT CHANGE UP (ref 0–7)
IMM GRANULOCYTES NFR BLD AUTO: 0.4 % — HIGH (ref 0.1–0.3)
KETONES UR-MCNC: SIGNIFICANT CHANGE UP
LEUKOCYTE ESTERASE UR-ACNC: ABNORMAL
LIDOCAIN IGE QN: 11 U/L — SIGNIFICANT CHANGE UP (ref 7–60)
LYMPHOCYTES # BLD AUTO: 1.3 K/UL — SIGNIFICANT CHANGE UP (ref 1.2–3.4)
LYMPHOCYTES # BLD AUTO: 29 % — SIGNIFICANT CHANGE UP (ref 20.5–51.1)
MCHC RBC-ENTMCNC: 27.6 PG — SIGNIFICANT CHANGE UP (ref 27–31)
MCHC RBC-ENTMCNC: 32.1 G/DL — SIGNIFICANT CHANGE UP (ref 32–37)
MCV RBC AUTO: 85.9 FL — SIGNIFICANT CHANGE UP (ref 81–99)
MONOCYTES # BLD AUTO: 0.41 K/UL — SIGNIFICANT CHANGE UP (ref 0.1–0.6)
MONOCYTES NFR BLD AUTO: 9.2 % — SIGNIFICANT CHANGE UP (ref 1.7–9.3)
NEUTROPHILS # BLD AUTO: 2.52 K/UL — SIGNIFICANT CHANGE UP (ref 1.4–6.5)
NEUTROPHILS NFR BLD AUTO: 56.2 % — SIGNIFICANT CHANGE UP (ref 42.2–75.2)
NITRITE UR-MCNC: NEGATIVE — SIGNIFICANT CHANGE UP
NRBC # BLD: 0 /100 WBCS — SIGNIFICANT CHANGE UP (ref 0–0)
PH UR: 6 — SIGNIFICANT CHANGE UP (ref 5–8)
PLATELET # BLD AUTO: 316 K/UL — SIGNIFICANT CHANGE UP (ref 130–400)
POTASSIUM SERPL-MCNC: 3.9 MMOL/L — SIGNIFICANT CHANGE UP (ref 3.5–5)
POTASSIUM SERPL-SCNC: 3.9 MMOL/L — SIGNIFICANT CHANGE UP (ref 3.5–5)
PROT SERPL-MCNC: 7 G/DL — SIGNIFICANT CHANGE UP (ref 6–8)
PROT UR-MCNC: ABNORMAL
RBC # BLD: 4.1 M/UL — LOW (ref 4.2–5.4)
RBC # FLD: 16 % — HIGH (ref 11.5–14.5)
RBC CASTS # UR COMP ASSIST: 7 /HPF — HIGH (ref 0–4)
SODIUM SERPL-SCNC: 141 MMOL/L — SIGNIFICANT CHANGE UP (ref 135–146)
SP GR SPEC: 1.03 — HIGH (ref 1.01–1.02)
TROPONIN T SERPL-MCNC: <0.01 NG/ML — SIGNIFICANT CHANGE UP
UROBILINOGEN FLD QL: ABNORMAL
WBC # BLD: 4.48 K/UL — LOW (ref 4.8–10.8)
WBC # FLD AUTO: 4.48 K/UL — LOW (ref 4.8–10.8)
WBC UR QL: 4 /HPF — SIGNIFICANT CHANGE UP (ref 0–5)

## 2020-08-09 PROCEDURE — 99285 EMERGENCY DEPT VISIT HI MDM: CPT

## 2020-08-09 PROCEDURE — 71046 X-RAY EXAM CHEST 2 VIEWS: CPT | Mod: 26

## 2020-08-09 PROCEDURE — 93010 ELECTROCARDIOGRAM REPORT: CPT

## 2020-08-09 RX ORDER — TIZANIDINE 4 MG/1
1 TABLET ORAL
Qty: 28 | Refills: 0
Start: 2020-08-09 | End: 2020-08-15

## 2020-08-09 RX ORDER — SODIUM CHLORIDE 9 MG/ML
1000 INJECTION INTRAMUSCULAR; INTRAVENOUS; SUBCUTANEOUS ONCE
Refills: 0 | Status: COMPLETED | OUTPATIENT
Start: 2020-08-09 | End: 2020-08-09

## 2020-08-09 RX ORDER — ONDANSETRON 8 MG/1
4 TABLET, FILM COATED ORAL ONCE
Refills: 0 | Status: COMPLETED | OUTPATIENT
Start: 2020-08-09 | End: 2020-08-09

## 2020-08-09 RX ORDER — FAMOTIDINE 10 MG/ML
20 INJECTION INTRAVENOUS ONCE
Refills: 0 | Status: COMPLETED | OUTPATIENT
Start: 2020-08-09 | End: 2020-08-09

## 2020-08-09 RX ADMIN — SODIUM CHLORIDE 1000 MILLILITER(S): 9 INJECTION INTRAMUSCULAR; INTRAVENOUS; SUBCUTANEOUS at 13:07

## 2020-08-09 RX ADMIN — ONDANSETRON 4 MILLIGRAM(S): 8 TABLET, FILM COATED ORAL at 13:04

## 2020-08-09 RX ADMIN — FAMOTIDINE 20 MILLIGRAM(S): 10 INJECTION INTRAVENOUS at 13:07

## 2020-08-09 NOTE — ED ADULT NURSE NOTE - OBJECTIVE STATEMENT
Pt c/o of chest pain that she woke up with radiating to left shoulder. Pt reports having GI issues recently and says "it's stress" and started birth control pills 1 month ago. Pt states "I feel weakness".

## 2020-08-09 NOTE — ED PROVIDER NOTE - PATIENT PORTAL LINK FT
You can access the FollowMyHealth Patient Portal offered by [facility] by registering at the following website: [email or web address]. By joining [facility]’s FollowMyHealth portal, you will also be able to view your health information using other applications (apps) compatible with our system.

## 2020-08-09 NOTE — ED PROVIDER NOTE - ATTENDING CONTRIBUTION TO CARE
I personally evaluated the patient. I reviewed the Resident’s or Physician Assistant’s note (as assigned above), and agree with the findings and plan except as documented in my note.  31 yo woman with left sided chest pain worse with inspiration and movement of her left arm.  Normal EKG, labs and CXRAY.  Appears likely musculoskeletal secondary to lifting her 35 pound 1 year old daughter repeatedly.  She also asked me about anxiety as she states she has been feeling very anxious.  Will start a muscle relaxant, NSAIDs and refer to OP Mental health for assessment of her anxiety.

## 2020-08-09 NOTE — ED PROVIDER NOTE - CLINICAL SUMMARY MEDICAL DECISION MAKING FREE TEXT BOX
29 yo woman with musculoskeletal chest pain.  Workup normal.  Including negative D-dimer.  Stable for discharge and outpatient follow up.

## 2020-08-09 NOTE — ED ADULT TRIAGE NOTE - CHIEF COMPLAINT QUOTE
pt c/o chest pain x several days "I don't know if it's anxiety" Respirations even and unlabored. Denies SOB

## 2020-08-09 NOTE — ED PROVIDER NOTE - NSFOLLOWUPCLINICS_GEN_ALL_ED_FT
[facility]  OP [facility]  [address] [place] [address]  Phone: [phone]  Fax:   Follow Up Time:

## 2020-08-09 NOTE — ED PROVIDER NOTE - OBJECTIVE STATEMENT
31 y/o female presents to the ED c/o "I have left sided chest tightness/ pressure non-radiating that started this morning. It hurts when I take a deep breath. I've been having vaginal bleeding and I recently saw my GYN. I take BCP." no cough/fever/ chills/ n/v/d

## 2020-08-24 ENCOUNTER — EMERGENCY (EMERGENCY)
Facility: HOSPITAL | Age: 30
LOS: 0 days | Discharge: HOME | End: 2020-08-24
Attending: EMERGENCY MEDICINE | Admitting: EMERGENCY MEDICINE
Payer: COMMERCIAL

## 2020-08-24 VITALS
DIASTOLIC BLOOD PRESSURE: 88 MMHG | HEART RATE: 78 BPM | RESPIRATION RATE: 18 BRPM | OXYGEN SATURATION: 98 % | SYSTOLIC BLOOD PRESSURE: 133 MMHG | TEMPERATURE: 100 F

## 2020-08-24 DIAGNOSIS — R10.9 UNSPECIFIED ABDOMINAL PAIN: ICD-10-CM

## 2020-08-24 DIAGNOSIS — K64.4 RESIDUAL HEMORRHOIDAL SKIN TAGS: ICD-10-CM

## 2020-08-24 DIAGNOSIS — Z98.890 OTHER SPECIFIED POSTPROCEDURAL STATES: ICD-10-CM

## 2020-08-24 DIAGNOSIS — K64.8 OTHER HEMORRHOIDS: ICD-10-CM

## 2020-08-24 DIAGNOSIS — Z90.49 ACQUIRED ABSENCE OF OTHER SPECIFIED PARTS OF DIGESTIVE TRACT: Chronic | ICD-10-CM

## 2020-08-24 PROCEDURE — 99283 EMERGENCY DEPT VISIT LOW MDM: CPT

## 2020-08-24 RX ORDER — MINERAL OIL
133 OIL (ML) MISCELLANEOUS ONCE
Refills: 0 | Status: COMPLETED | OUTPATIENT
Start: 2020-08-24 | End: 2020-08-24

## 2020-08-24 RX ORDER — DOCUSATE SODIUM 100 MG
1 CAPSULE ORAL
Qty: 10 | Refills: 0
Start: 2020-08-24 | End: 2020-08-28

## 2020-08-24 RX ADMIN — Medication 133 MILLILITER(S): at 20:20

## 2020-08-24 NOTE — ED PROVIDER NOTE - ATTENDING CONTRIBUTION TO CARE
29 yo female with PMH of DUB (had menstrual bleed from July to August, took herself off her OCP because of it), who presents to the ER for rectal pain and constipation x 1 week. Feels a fullness pressure sensation in the rectum, feels she has tenesmus, goes to bathroom and states no stool passing. Pain and pressure building up and pushing forward into her vagina as well. Denies any N/V/dysuria/F/C/rash. On exam agree with PA note, no distension of abdomen, soft, +BS, RECTAL: pt with non thrombosed external hemorrhoid, palpable internal hemorrhoid. No fissures. No bleeding. No purulent discharge. Offered pt enema and stool softener but pt did not want to take it here, as she would prefer to use her own bathroom. PT given the meds and dc'd home with instruction to return if still in pain or any worsening of symptoms. Pt amenable to this plan.

## 2020-08-24 NOTE — ED PROVIDER NOTE - CARE PROVIDER_API CALL
Melvi Ruiz (MD)  Gastroenterology  [address] [place] [address]  Phone: [phone]  Fax: [phone]  Follow Up Time:

## 2020-08-24 NOTE — ED PROVIDER NOTE - OBJECTIVE STATEMENT
30y F no ppmh presents for eval of rectal pain. Pt has mild fullness like pain in her rectum x1wk, aggravated with bm, no relieving factors. Denies fever, ha, cp, sob, weakness, numbness, n/v, dysuria, hematuria

## 2020-08-24 NOTE — ED PROVIDER NOTE - NS ED ROS FT
Constitutional: (-) fever  Eyes/ENT: (-) blurry vision, (-) epistaxis  Cardiovascular: (-) chest pain, (-) syncope  Respiratory: (-) cough, (-) shortness of breath  Gastrointestinal: (+) rectal pain, (-) vomiting, (-) diarrhea  : (-) dysuria, (-) hematuria  Musculoskeletal: (-) neck pain, (-) back pain, (-) joint pain  Integumentary: (-) rash, (-) edema  Neurological: (-) headache, (-) altered mental status  Allergic/Immunologic: (-) pruritus

## 2020-08-24 NOTE — ED PROVIDER NOTE - CLINICAL SUMMARY MEDICAL DECISION MAKING FREE TEXT BOX
31 yo female with PMH of DUB (had menstrual bleed from July to August, took herself off her OCP because of it), who presents to the ER for rectal pain and constipation x 1 week. Feels a fullness pressure sensation in the rectum, feels she has tenesmus, goes to bathroom and states no stool passing. Pain and pressure building up and pushing forward into her vagina as well. Denies any N/V/dysuria/F/C/rash. On exam agree with PA note, no distension of abdomen, soft, +BS, RECTAL: pt with non thrombosed external hemorrhoid, palpable internal hemorrhoid. No fissures. No bleeding. No purulent discharge. Offered pt enema and stool softener but pt did not want to take it here, as she would prefer to use her own bathroom. PT given the meds and dc'd home with instruction to return if still in pain or any worsening of symptoms. Pt amenable to this plan.

## 2020-08-24 NOTE — ED PROVIDER NOTE - PATIENT PORTAL LINK FT
You can access the FollowMyHealth Patient Portal offered by [facility] by registering at the following website: [email or web address]. By joining [employer]’s FollowMyHealth portal, you will also be able to view your health information using other applications (apps) compatible with our system.

## 2020-08-25 ENCOUNTER — EMERGENCY (EMERGENCY)
Facility: HOSPITAL | Age: 30
LOS: 0 days | Discharge: HOME | End: 2020-08-25
Attending: EMERGENCY MEDICINE | Admitting: EMERGENCY MEDICINE
Payer: COMMERCIAL

## 2020-08-25 VITALS
WEIGHT: 229.94 LBS | SYSTOLIC BLOOD PRESSURE: 126 MMHG | RESPIRATION RATE: 18 BRPM | DIASTOLIC BLOOD PRESSURE: 63 MMHG | TEMPERATURE: 99 F | HEART RATE: 97 BPM | OXYGEN SATURATION: 99 % | HEIGHT: 67 IN

## 2020-08-25 DIAGNOSIS — R10.9 UNSPECIFIED ABDOMINAL PAIN: ICD-10-CM

## 2020-08-25 DIAGNOSIS — Z90.49 ACQUIRED ABSENCE OF OTHER SPECIFIED PARTS OF DIGESTIVE TRACT: Chronic | ICD-10-CM

## 2020-08-25 DIAGNOSIS — K59.00 CONSTIPATION, UNSPECIFIED: ICD-10-CM

## 2020-08-25 DIAGNOSIS — Z90.49 ACQUIRED ABSENCE OF OTHER SPECIFIED PARTS OF DIGESTIVE TRACT: ICD-10-CM

## 2020-08-25 PROCEDURE — 99284 EMERGENCY DEPT VISIT MOD MDM: CPT

## 2020-08-25 RX ORDER — SOD SULF/SODIUM/NAHCO3/KCL/PEG
4000 SOLUTION, RECONSTITUTED, ORAL ORAL ONCE
Refills: 0 | Status: COMPLETED | OUTPATIENT
Start: 2020-08-25 | End: 2020-08-25

## 2020-08-25 RX ORDER — POLYETHYLENE GLYCOL 3350 17 G/17G
17 POWDER, FOR SOLUTION ORAL
Qty: 238 | Refills: 0
Start: 2020-08-25 | End: 2020-09-07

## 2020-08-25 RX ADMIN — Medication 4000 MILLILITER(S): at 02:51

## 2020-08-25 RX ADMIN — Medication 1 ENEMA: at 02:51

## 2020-08-25 NOTE — ED ADULT NURSE NOTE - OBJECTIVE STATEMENT
" I'm still having  abdominal and rectal pain, and still no BM in spite of the medication given earlier." constipation x1 week is concerned about obstruction

## 2020-08-25 NOTE — ED PROVIDER NOTE - PHYSICAL EXAMINATION
CONSTITUTIONAL: patient standing at stretcher, uncomfortable but in NAD  SKIN: Warm dry, normal skin turgor  HEAD: NCAT  EYES: EOMI, PERRLA, no scleral icterus, conjunctiva pink  ENT: normal pharynx with no erythema or exudates  NECK: Supple; non tender. Full ROM.  CARD: RRR, no murmurs.  RESP: clear to ausculation b/l. No crackles or wheezing.  ABD: soft, tender to palpation in lower quadrants, non-distended, no rebound or guarding, hypoactive bowel sounds  EXT: Full ROM, no bony tenderness, no pedal edema, no calf tenderness  NEURO: normal motor. normal sensory. Normal gait.  PSYCH: Cooperative, appropriate.

## 2020-08-25 NOTE — ED PROVIDER NOTE - PROGRESS NOTE DETAILS
Patient received another fleet enema today and was immediately able to have a bowel movement. Reports feeling much better and would like to go home. Full DC instructions discussed and patient knows when to seek immediate medical attention. Patient has proper follow-up. All results discussed with the patient they may require further work-up. Limitations of ED work-up discussed. All  questions and concerns from patient or family addressed. Understanding of insturctions verbalized

## 2020-08-25 NOTE — ED PROVIDER NOTE - NS ED ROS FT
Constitutional:  (-) fever, (-) chills, (-) lethargy  Eyes:  (-) eye pain (-) visual changes  ENMT: (-) nasal discharge, (-) sore throat. (-) neck pain or stiffness  Cardiac: (-) chest pain (-) palpitations  Respiratory:  (-) cough (-) respiratory distress.   GI:  (-) nausea (-) vomiting (-) diarrhea (+) suprapubic abdominal pain. (+rectal pain)  :  (-) dysuria (-) frequency (-) burning.  MS:  (-) back pain (-) joint pain.  Neuro:  (-) headache (-) numbness (-) tingling (-) focal weakness  Skin:  (-) rash  Except as documented in the HPI,  all other systems are negative

## 2020-08-25 NOTE — ED PROVIDER NOTE - PATIENT PORTAL LINK FT
You can access the FollowMyHealth Patient Portal offered by [facility] by registering at the following website: [email or web address]. By joining [employer]’s FollowMyHealth portal, you will also be able to view your health information using other applications (apps) compatible with our system.

## 2020-08-25 NOTE — ED PROVIDER NOTE - NSFOLLOWUPCLINICS_GEN_ALL_ED_FT
[facility]  Gastroenterology  [address] [place] [address]  Phone: [phone]  Fax:   Follow Up Time: 4-6 Days

## 2020-08-25 NOTE — ED PROVIDER NOTE - OBJECTIVE STATEMENT
29 yo F w PMH of cholecystectomy presenting with suprapubic pain and constipation. Patient has not had a bowel movement in a week. Has tried colace and a fleet enema with no relief. Denies fevers, chills, nausea, vomiting, diarrhea, vaginal discharge, hematuria, dysuria.

## 2020-08-25 NOTE — ED PROVIDER NOTE - ATTENDING CONTRIBUTION TO CARE
I personally evaluated the patient. I reviewed the Resident’s or Physician Assistant’s note (as assigned above), and agree with the findings and plan except as documented in my note.    29 yo F w PMH of cholecystectomy presenting with constipation. Tried colace w no relef. Last BM 2 days ago. Reports hx of constipation. No abd pain or vomiting.     CONSTITUTIONAL: Well-developed; well-nourished; in no acute distress. Sitting up and providing appropriate history and physical examination  SKIN: skin exam is warm and dry, no acute rash.  HEAD: Normocephalic; atraumatic.  EYES: PERRL, 3 mm bilateral, no nystagmus, EOM intact; conjunctiva and sclera clear.  ENT: No nasal discharge; airway clear.  NECK: Supple; non tender.+ full passive ROM in all directions. No JVD  CARD: S1, S2 normal; no murmurs, gallops, or rubs. Regular rate and rhythm. + Symmetric Strong Pulses  RESP: No wheezes, rales or rhonchi. Good air movement bilaterally  ABD: soft; non-distended; non-tender. No Rebound, No Gaurding, No signs of peritnitis, No CVA tenderness  EXT: Normal ROM. No clubbing, cyanosis or edema.     Plan- laxative, fleet enema, reassess

## 2020-08-25 NOTE — ED ADULT TRIAGE NOTE - CHIEF COMPLAINT QUOTE
" I'm still having  abdominal and rectal pain, and still no BM in spite of the medication given earlier."

## 2020-08-25 NOTE — ED PROVIDER NOTE - CARE PROVIDER_API CALL
Silas Blake  GASTROENTEROLOGY  [address] [place] [address]  Phone: [phone]  Fax: [phone]  Follow Up Time:

## 2020-08-27 ENCOUNTER — APPOINTMENT (OUTPATIENT)
Dept: GASTROENTEROLOGY | Facility: CLINIC | Age: 30
End: 2020-08-27
Payer: COMMERCIAL

## 2020-08-27 DIAGNOSIS — Z80.41 FAMILY HISTORY OF MALIGNANT NEOPLASM OF OVARY: ICD-10-CM

## 2020-08-27 DIAGNOSIS — Z87.19 PERSONAL HISTORY OF OTHER DISEASES OF THE DIGESTIVE SYSTEM: ICD-10-CM

## 2020-08-27 DIAGNOSIS — Z78.9 OTHER SPECIFIED HEALTH STATUS: ICD-10-CM

## 2020-08-27 DIAGNOSIS — R10.2 PELVIC AND PERINEAL PAIN: ICD-10-CM

## 2020-08-27 DIAGNOSIS — K59.00 CONSTIPATION, UNSPECIFIED: ICD-10-CM

## 2020-08-27 DIAGNOSIS — Z80.3 FAMILY HISTORY OF MALIGNANT NEOPLASM OF BREAST: ICD-10-CM

## 2020-08-27 PROCEDURE — 99203 OFFICE O/P NEW LOW 30 MIN: CPT | Mod: 95

## 2020-08-27 RX ORDER — ENEMA 19; 7 G/133ML; G/133ML
7-19 ENEMA RECTAL
Qty: 4 | Refills: 0 | Status: ACTIVE | COMMUNITY
Start: 2020-08-27 | End: 1900-01-01

## 2020-08-27 RX ORDER — POLYETHYLENE GLYCOL 3350, SODIUM SULFATE ANHYDROUS, SODIUM BICARBONATE, SODIUM CHLORIDE, POTASSIUM CHLORIDE 227.1; 21.5; 6.36; 5.53; .754 G/L; G/L; G/L; G/L; G/L
227.1 POWDER, FOR SOLUTION ORAL
Qty: 1 | Refills: 0 | Status: ACTIVE | COMMUNITY
Start: 2020-08-27 | End: 1900-01-01

## 2020-08-27 NOTE — REVIEW OF SYSTEMS
[Fever] : no fever [Earache] : no earache [Eye Pain] : no eye pain [Shortness Of Breath] : no shortness of breath [Palpitations] : no palpitations [Skin Lesions] : no skin lesions [As Noted in HPI] : as noted in HPI [Easy Bleeding] : no tendency for easy bleeding

## 2020-08-27 NOTE — ASSESSMENT
[FreeTextEntry1] : 30 year old female with abdominal pain and new onset constipation\par \par - Golytely 4L over 2 days\par - dulcolax 5mg per day x 2 days\par - fleet enema bid x 2 days\par If symptoms worsen or if they fail to improve after two days, she was advised to go to the ED for a colonoscopy

## 2020-08-27 NOTE — HISTORY OF PRESENT ILLNESS
[Home] : at home, [unfilled] , at the time of the visit. [Medical Office: ([facility])___] : at the medical office located in  [Verbal consent obtained from patient] : the patient, [unfilled] [de-identified] : 30 year old female who was seen at [facility] ER on August 25th for suprapubic pain and constipation that had not responded to fleet enemas, golytely (~2 liters), miralax and colace. She had two small hard stools earlier today Which was her first bowel movement in 2 weeks. She still reports abdominal pain and a reduced appetite. The patient also reports nausea.\par \par Prior to this she would have a bowel movement  every 2-3 days.\par \par The patient denies rectal bleeding, melena, diarrhea,  change in stool caliber, weight loss,change in appetite, vomiting, difficulty swallowing, early satiety,   fever or chills.\par \par \par

## 2020-11-18 ENCOUNTER — EMERGENCY (EMERGENCY)
Facility: HOSPITAL | Age: 30
LOS: 0 days | Discharge: HOME | End: 2020-11-18
Attending: EMERGENCY MEDICINE | Admitting: EMERGENCY MEDICINE
Payer: COMMERCIAL

## 2020-11-18 VITALS
RESPIRATION RATE: 22 BRPM | HEIGHT: 67 IN | OXYGEN SATURATION: 100 % | HEART RATE: 78 BPM | DIASTOLIC BLOOD PRESSURE: 61 MMHG | TEMPERATURE: 98 F | SYSTOLIC BLOOD PRESSURE: 126 MMHG | WEIGHT: 211.64 LBS

## 2020-11-18 DIAGNOSIS — R00.2 PALPITATIONS: ICD-10-CM

## 2020-11-18 DIAGNOSIS — Z90.49 ACQUIRED ABSENCE OF OTHER SPECIFIED PARTS OF DIGESTIVE TRACT: Chronic | ICD-10-CM

## 2020-11-18 LAB
ANION GAP SERPL CALC-SCNC: 10 MMOL/L — SIGNIFICANT CHANGE UP (ref 7–14)
BASOPHILS # BLD AUTO: 0.04 K/UL — SIGNIFICANT CHANGE UP (ref 0–0.2)
BASOPHILS NFR BLD AUTO: 1.1 % — HIGH (ref 0–1)
BUN SERPL-MCNC: 8 MG/DL — LOW (ref 10–20)
CALCIUM SERPL-MCNC: 9.2 MG/DL — SIGNIFICANT CHANGE UP (ref 8.5–10.1)
CHLORIDE SERPL-SCNC: 107 MMOL/L — SIGNIFICANT CHANGE UP (ref 98–110)
CO2 SERPL-SCNC: 22 MMOL/L — SIGNIFICANT CHANGE UP (ref 17–32)
CREAT SERPL-MCNC: 0.8 MG/DL — SIGNIFICANT CHANGE UP (ref 0.7–1.5)
EOSINOPHIL # BLD AUTO: 0.26 K/UL — SIGNIFICANT CHANGE UP (ref 0–0.7)
EOSINOPHIL NFR BLD AUTO: 6.9 % — SIGNIFICANT CHANGE UP (ref 0–8)
GLUCOSE SERPL-MCNC: 94 MG/DL — SIGNIFICANT CHANGE UP (ref 70–99)
HCT VFR BLD CALC: 37 % — SIGNIFICANT CHANGE UP (ref 37–47)
HGB BLD-MCNC: 12 G/DL — SIGNIFICANT CHANGE UP (ref 12–16)
IMM GRANULOCYTES NFR BLD AUTO: 0.3 % — SIGNIFICANT CHANGE UP (ref 0.1–0.3)
LYMPHOCYTES # BLD AUTO: 1.08 K/UL — LOW (ref 1.2–3.4)
LYMPHOCYTES # BLD AUTO: 28.5 % — SIGNIFICANT CHANGE UP (ref 20.5–51.1)
MAGNESIUM SERPL-MCNC: 1.8 MG/DL — SIGNIFICANT CHANGE UP (ref 1.8–2.4)
MCHC RBC-ENTMCNC: 27.5 PG — SIGNIFICANT CHANGE UP (ref 27–31)
MCHC RBC-ENTMCNC: 32.4 G/DL — SIGNIFICANT CHANGE UP (ref 32–37)
MCV RBC AUTO: 84.7 FL — SIGNIFICANT CHANGE UP (ref 81–99)
MONOCYTES # BLD AUTO: 0.35 K/UL — SIGNIFICANT CHANGE UP (ref 0.1–0.6)
MONOCYTES NFR BLD AUTO: 9.2 % — SIGNIFICANT CHANGE UP (ref 1.7–9.3)
NEUTROPHILS # BLD AUTO: 2.05 K/UL — SIGNIFICANT CHANGE UP (ref 1.4–6.5)
NEUTROPHILS NFR BLD AUTO: 54 % — SIGNIFICANT CHANGE UP (ref 42.2–75.2)
NRBC # BLD: 0 /100 WBCS — SIGNIFICANT CHANGE UP (ref 0–0)
PLATELET # BLD AUTO: 268 K/UL — SIGNIFICANT CHANGE UP (ref 130–400)
POTASSIUM SERPL-MCNC: 3.5 MMOL/L — SIGNIFICANT CHANGE UP (ref 3.5–5)
POTASSIUM SERPL-SCNC: 3.5 MMOL/L — SIGNIFICANT CHANGE UP (ref 3.5–5)
RBC # BLD: 4.37 M/UL — SIGNIFICANT CHANGE UP (ref 4.2–5.4)
RBC # FLD: 14.6 % — HIGH (ref 11.5–14.5)
SODIUM SERPL-SCNC: 139 MMOL/L — SIGNIFICANT CHANGE UP (ref 135–146)
WBC # BLD: 3.79 K/UL — LOW (ref 4.8–10.8)
WBC # FLD AUTO: 3.79 K/UL — LOW (ref 4.8–10.8)

## 2020-11-18 PROCEDURE — 93010 ELECTROCARDIOGRAM REPORT: CPT

## 2020-11-18 PROCEDURE — 99284 EMERGENCY DEPT VISIT MOD MDM: CPT

## 2020-11-18 NOTE — ED ADULT TRIAGE NOTE - CHIEF COMPLAINT QUOTE
I'm here because of palpitations, they woke me up. I was recently diagnosed with hyperthyriod and it might be a opart of it

## 2020-11-18 NOTE — ED PROVIDER NOTE - OBJECTIVE STATEMENT
29 y/o F recently diagnosed with hyperthyroidism by her GYN in september, told to see PCP who told her she cannot rx medications for it and recommended optho f/u who told her she had evidence of early graves disease and made an appointment with endocrinology but it is not until 1 month from now presents with intermittent palpitations x 1 month and tonight she has been feeling gradual onset constant palpitations x 12 hrs. no palliating/provoking factors.   no cp/sob/fever/n/v.

## 2020-11-18 NOTE — ED PROVIDER NOTE - PROGRESS NOTE DETAILS
SAHMAR: Reviewed all results and necessity for follow up. Counseled on red flags and to return for them.  Patient appears well on discharge.

## 2020-11-18 NOTE — ED PROVIDER NOTE - CARE PROVIDER_API CALL
Donte Campuzano  ENDOCRINOLOGY/METAB/DIABETES  [address] [place] [address]  Phone: [phone]  Fax: [phone]  Follow Up Time: 1-3 Days    Rickie Saavedra  ENDOCRINOLOGY/METAB/DIABETES  [address] [place] [address]  Phone: [phone]  Fax: [phone]  Follow Up Time: 1-3 Days    Vincent Villa  ENDOCRINOLOGY/METAB/DIABETES  [address] [place] [address]  Phone: [phone]  Fax: [phone]  Follow Up Time: 1-3 Days

## 2020-11-18 NOTE — ED PROVIDER NOTE - CLINICAL SUMMARY MEDICAL DECISION MAKING FREE TEXT BOX
Patient has been on monitor without tachycardia -- labs unremarkable. Patient to follow up with endocrinology, cardiology. Patient to return for any new or worsening conditions.

## 2020-11-18 NOTE — ED PROVIDER NOTE - NS ED ROS FT
Review of Systems    Constitutional: (-) fever   Eyes/ENT: (-) vision changes  Cardiovascular: (-) chest pain, (-) syncope (+) palpitations  Respiratory: (-) cough, (-) shortness of breath  Gastrointestinal: (-) vomiting, (-) diarrhea (-)black/bloody stools (-) abdominal pain  Genitourinary:  (-) dysuria   Musculoskeletal: (-) neck pain, (-) back pain, (-) leg pain/swelling  Integumentary: (-) rash, (-) edema  Neurological: (-) headache, (-) confusion  Hematologic: (-) easy bruising   Psychiatric: (-) hallucinations  Allergic/Immunologic: (-) pruritus

## 2020-11-18 NOTE — ED PROVIDER NOTE - PHYSICAL EXAMINATION
PHYSICAL EXAM:    GENERAL: Alert, appears stated age, well appearing, non-toxic  SKIN: Warm, pink and dry. MMM.   HEAD: NC  EYE: Normal lids/conjunctiva  ENT: Normal hearing, patent oropharynx  NECK: +supple. No meningismus, or JVD  Pulm: Bilateral BS, normal resp effort, no wheezes, stridor, or retractions  CV: RRR, no M/R/G, 2+and = radial pulses  Abd: soft, non-tender, non-distended  Mskel: no erythema, cyanosis, edema. no calf tenderness  Neuro: AAOx3,  normal gait.

## 2020-11-18 NOTE — ED PROVIDER NOTE - PATIENT PORTAL LINK FT
You can access the FollowMyHealth Patient Portal offered by [facility] by registering at the following website: [email or web address]. By joining [facility]’s FollowMyHealth portal, you will also be able to view your health information using other applications (apps) compatible with our system.

## 2020-11-18 NOTE — ED PROVIDER NOTE - NS_EDPROVIDERDISPOUSERTYPE_ED_A_ED
Attending Attestation (For Attendings USE Only)... Siliq Counseling:  I discussed with the patient the risks of Siliq including but not limited to new or worsening depression, suicidal thoughts and behavior, immunosuppression, malignancy, posterior leukoencephalopathy syndrome, and serious infections.  The patient understands that monitoring is required including a PPD at baseline and must alert us or the primary physician if symptoms of infection or other concerning signs are noted. There is also a special program designed to monitor depression which is required with Siliq.

## 2020-11-18 NOTE — ED PROVIDER NOTE - ATTENDING CONTRIBUTION TO CARE
29 yo F with hx of hyperthyroidism, diagnosed by primary GYN 9/2020, however has been having difficulty getting follow up with endo and definitive treatment -- she is currently on no meds -- here for assessment of palpitations, woke her from sleep -- no fever, nausea, vomiting, weight loss, dyspnea.     VS normal in ED. On exam well appearing, in no distress, clear lungs, RRR, soft, NT, ND abdomen.     EKG is NSR with normal rate.     Unclear etiology of palpitations at this time. Will check lytes and reassess.

## 2020-11-18 NOTE — ED PROVIDER NOTE - CARE PROVIDERS DIRECT ADDRESSES
,DirectAddress_Unknown,DirectAddress_Unknown,[email or web address]

## 2021-08-16 ENCOUNTER — EMERGENCY (EMERGENCY)
Facility: HOSPITAL | Age: 31
LOS: 0 days | Discharge: HOME | End: 2021-08-16
Attending: STUDENT IN AN ORGANIZED HEALTH CARE EDUCATION/TRAINING PROGRAM | Admitting: STUDENT IN AN ORGANIZED HEALTH CARE EDUCATION/TRAINING PROGRAM
Payer: MEDICAID

## 2021-08-16 VITALS
HEART RATE: 88 BPM | DIASTOLIC BLOOD PRESSURE: 87 MMHG | HEIGHT: 67 IN | WEIGHT: 229.94 LBS | TEMPERATURE: 97 F | SYSTOLIC BLOOD PRESSURE: 128 MMHG | OXYGEN SATURATION: 100 % | RESPIRATION RATE: 18 BRPM

## 2021-08-16 DIAGNOSIS — R10.32 LEFT LOWER QUADRANT PAIN: ICD-10-CM

## 2021-08-16 DIAGNOSIS — Z90.49 ACQUIRED ABSENCE OF OTHER SPECIFIED PARTS OF DIGESTIVE TRACT: Chronic | ICD-10-CM

## 2021-08-16 DIAGNOSIS — K59.00 CONSTIPATION, UNSPECIFIED: ICD-10-CM

## 2021-08-16 DIAGNOSIS — Z90.49 ACQUIRED ABSENCE OF OTHER SPECIFIED PARTS OF DIGESTIVE TRACT: ICD-10-CM

## 2021-08-16 DIAGNOSIS — N73.9 FEMALE PELVIC INFLAMMATORY DISEASE, UNSPECIFIED: ICD-10-CM

## 2021-08-16 DIAGNOSIS — R11.2 NAUSEA WITH VOMITING, UNSPECIFIED: ICD-10-CM

## 2021-08-16 LAB
ALBUMIN SERPL ELPH-MCNC: 4.3 G/DL — SIGNIFICANT CHANGE UP (ref 3.5–5.2)
ALP SERPL-CCNC: 58 U/L — SIGNIFICANT CHANGE UP (ref 30–115)
ALT FLD-CCNC: 7 U/L — SIGNIFICANT CHANGE UP (ref 0–41)
ANION GAP SERPL CALC-SCNC: 13 MMOL/L — SIGNIFICANT CHANGE UP (ref 7–14)
APPEARANCE UR: ABNORMAL
AST SERPL-CCNC: 12 U/L — SIGNIFICANT CHANGE UP (ref 0–41)
BACTERIA # UR AUTO: ABNORMAL
BASOPHILS # BLD AUTO: 0.05 K/UL — SIGNIFICANT CHANGE UP (ref 0–0.2)
BASOPHILS NFR BLD AUTO: 0.9 % — SIGNIFICANT CHANGE UP (ref 0–1)
BILIRUB DIRECT SERPL-MCNC: <0.2 MG/DL — SIGNIFICANT CHANGE UP (ref 0–0.2)
BILIRUB INDIRECT FLD-MCNC: >0 MG/DL — LOW (ref 0.2–1.2)
BILIRUB SERPL-MCNC: 0.2 MG/DL — SIGNIFICANT CHANGE UP (ref 0.2–1.2)
BILIRUB UR-MCNC: NEGATIVE — SIGNIFICANT CHANGE UP
BUN SERPL-MCNC: 8 MG/DL — LOW (ref 10–20)
CALCIUM SERPL-MCNC: 9.2 MG/DL — SIGNIFICANT CHANGE UP (ref 8.5–10.1)
CHLORIDE SERPL-SCNC: 105 MMOL/L — SIGNIFICANT CHANGE UP (ref 98–110)
CO2 SERPL-SCNC: 20 MMOL/L — SIGNIFICANT CHANGE UP (ref 17–32)
COLOR SPEC: YELLOW — SIGNIFICANT CHANGE UP
CREAT SERPL-MCNC: 0.7 MG/DL — SIGNIFICANT CHANGE UP (ref 0.7–1.5)
DIFF PNL FLD: NEGATIVE — SIGNIFICANT CHANGE UP
EOSINOPHIL # BLD AUTO: 0.23 K/UL — SIGNIFICANT CHANGE UP (ref 0–0.7)
EOSINOPHIL NFR BLD AUTO: 4 % — SIGNIFICANT CHANGE UP (ref 0–8)
EPI CELLS # UR: 14 /HPF — HIGH (ref 0–5)
GLUCOSE SERPL-MCNC: 95 MG/DL — SIGNIFICANT CHANGE UP (ref 70–99)
GLUCOSE UR QL: NEGATIVE — SIGNIFICANT CHANGE UP
HCT VFR BLD CALC: 36.1 % — LOW (ref 37–47)
HGB BLD-MCNC: 11.7 G/DL — LOW (ref 12–16)
HYALINE CASTS # UR AUTO: 1 /LPF — SIGNIFICANT CHANGE UP (ref 0–7)
IMM GRANULOCYTES NFR BLD AUTO: 0.3 % — SIGNIFICANT CHANGE UP (ref 0.1–0.3)
KETONES UR-MCNC: NEGATIVE — SIGNIFICANT CHANGE UP
LACTATE SERPL-SCNC: 1 MMOL/L — SIGNIFICANT CHANGE UP (ref 0.7–2)
LEUKOCYTE ESTERASE UR-ACNC: ABNORMAL
LIDOCAIN IGE QN: 17 U/L — SIGNIFICANT CHANGE UP (ref 7–60)
LYMPHOCYTES # BLD AUTO: 1.5 K/UL — SIGNIFICANT CHANGE UP (ref 1.2–3.4)
LYMPHOCYTES # BLD AUTO: 26.2 % — SIGNIFICANT CHANGE UP (ref 20.5–51.1)
MCHC RBC-ENTMCNC: 27.1 PG — SIGNIFICANT CHANGE UP (ref 27–31)
MCHC RBC-ENTMCNC: 32.4 G/DL — SIGNIFICANT CHANGE UP (ref 32–37)
MCV RBC AUTO: 83.6 FL — SIGNIFICANT CHANGE UP (ref 81–99)
MONOCYTES # BLD AUTO: 0.49 K/UL — SIGNIFICANT CHANGE UP (ref 0.1–0.6)
MONOCYTES NFR BLD AUTO: 8.6 % — SIGNIFICANT CHANGE UP (ref 1.7–9.3)
NEUTROPHILS # BLD AUTO: 3.43 K/UL — SIGNIFICANT CHANGE UP (ref 1.4–6.5)
NEUTROPHILS NFR BLD AUTO: 60 % — SIGNIFICANT CHANGE UP (ref 42.2–75.2)
NITRITE UR-MCNC: NEGATIVE — SIGNIFICANT CHANGE UP
NRBC # BLD: 0 /100 WBCS — SIGNIFICANT CHANGE UP (ref 0–0)
PH UR: 6 — SIGNIFICANT CHANGE UP (ref 5–8)
PLATELET # BLD AUTO: 287 K/UL — SIGNIFICANT CHANGE UP (ref 130–400)
POTASSIUM SERPL-MCNC: 4.1 MMOL/L — SIGNIFICANT CHANGE UP (ref 3.5–5)
POTASSIUM SERPL-SCNC: 4.1 MMOL/L — SIGNIFICANT CHANGE UP (ref 3.5–5)
PROT SERPL-MCNC: 7 G/DL — SIGNIFICANT CHANGE UP (ref 6–8)
PROT UR-MCNC: SIGNIFICANT CHANGE UP
RBC # BLD: 4.32 M/UL — SIGNIFICANT CHANGE UP (ref 4.2–5.4)
RBC # FLD: 16.5 % — HIGH (ref 11.5–14.5)
RBC CASTS # UR COMP ASSIST: 3 /HPF — SIGNIFICANT CHANGE UP (ref 0–4)
SODIUM SERPL-SCNC: 138 MMOL/L — SIGNIFICANT CHANGE UP (ref 135–146)
SP GR SPEC: 1.03 — HIGH (ref 1.01–1.03)
UROBILINOGEN FLD QL: SIGNIFICANT CHANGE UP
WBC # BLD: 5.72 K/UL — SIGNIFICANT CHANGE UP (ref 4.8–10.8)
WBC # FLD AUTO: 5.72 K/UL — SIGNIFICANT CHANGE UP (ref 4.8–10.8)
WBC UR QL: 6 /HPF — HIGH (ref 0–5)

## 2021-08-16 PROCEDURE — 99285 EMERGENCY DEPT VISIT HI MDM: CPT

## 2021-08-16 PROCEDURE — 76830 TRANSVAGINAL US NON-OB: CPT | Mod: 26

## 2021-08-16 PROCEDURE — 74177 CT ABD & PELVIS W/CONTRAST: CPT | Mod: 26,MA

## 2021-08-16 RX ORDER — CEFTRIAXONE 500 MG/1
500 INJECTION, POWDER, FOR SOLUTION INTRAMUSCULAR; INTRAVENOUS ONCE
Refills: 0 | Status: COMPLETED | OUTPATIENT
Start: 2021-08-16 | End: 2021-08-16

## 2021-08-16 RX ORDER — ONDANSETRON 8 MG/1
4 TABLET, FILM COATED ORAL ONCE
Refills: 0 | Status: COMPLETED | OUTPATIENT
Start: 2021-08-16 | End: 2021-08-16

## 2021-08-16 RX ORDER — ACETAMINOPHEN 500 MG
650 TABLET ORAL ONCE
Refills: 0 | Status: COMPLETED | OUTPATIENT
Start: 2021-08-16 | End: 2021-08-16

## 2021-08-16 RX ORDER — AZITHROMYCIN 500 MG/1
1 TABLET, FILM COATED ORAL ONCE
Refills: 0 | Status: COMPLETED | OUTPATIENT
Start: 2021-08-16 | End: 2021-08-16

## 2021-08-16 RX ORDER — SODIUM CHLORIDE 9 MG/ML
3200 INJECTION, SOLUTION INTRAVENOUS ONCE
Refills: 0 | Status: COMPLETED | OUTPATIENT
Start: 2021-08-16 | End: 2021-08-16

## 2021-08-16 RX ORDER — ONDANSETRON 8 MG/1
4 TABLET, FILM COATED ORAL ONCE
Refills: 0 | Status: DISCONTINUED | OUTPATIENT
Start: 2021-08-16 | End: 2021-08-16

## 2021-08-16 RX ADMIN — Medication 650 MILLIGRAM(S): at 20:43

## 2021-08-16 RX ADMIN — ONDANSETRON 4 MILLIGRAM(S): 8 TABLET, FILM COATED ORAL at 19:13

## 2021-08-16 RX ADMIN — ONDANSETRON 4 MILLIGRAM(S): 8 TABLET, FILM COATED ORAL at 18:33

## 2021-08-16 RX ADMIN — AZITHROMYCIN 1 GRAM(S): 500 TABLET, FILM COATED ORAL at 20:45

## 2021-08-16 RX ADMIN — CEFTRIAXONE 500 MILLIGRAM(S): 500 INJECTION, POWDER, FOR SOLUTION INTRAMUSCULAR; INTRAVENOUS at 20:00

## 2021-08-16 RX ADMIN — Medication 650 MILLIGRAM(S): at 18:34

## 2021-08-16 RX ADMIN — SODIUM CHLORIDE 3200 MILLILITER(S): 9 INJECTION, SOLUTION INTRAVENOUS at 20:45

## 2021-08-16 RX ADMIN — SODIUM CHLORIDE 3200 MILLILITER(S): 9 INJECTION, SOLUTION INTRAVENOUS at 18:34

## 2021-08-16 NOTE — ED PROVIDER NOTE - ATTENDING CONTRIBUTION TO CARE
31 y.o. F PMH of constipation s/p cholecystectomy pw abdominal pain for 2 weeks, LLQ radiating ot the RLQ. 5/10 burning no modifying factors NV x2 NBNB, + pain on coitus, + 1 sexual partner new. LMP 2 7/30/2021. denies fever and diarrhea.    Well appearing, NAD, non toxic. NCAT PERRLA EOMI neck supple non tender normal wob cta bl rrr abdomen s + bilateral suprapubic tenderness otherwise nd no rebound no guarding WWPx4 neuro non focal. + purulence on vaginal exam, + bilateral chandelier sign.    r/o PID, CT scan, pelvic ultrasound, labs

## 2021-08-16 NOTE — ED PROVIDER NOTE - DISCHARGE DATE
16-Aug-2021 Benzoyl Peroxide Counseling: Patient counseled that medicine may cause skin irritation and bleach clothing.  In the event of skin irritation, the patient was advised to reduce the amount of the drug applied or use it less frequently.   The patient verbalized understanding of the proper use and possible adverse effects of benzoyl peroxide.  All of the patient's questions and concerns were addressed.

## 2021-08-16 NOTE — ED PROVIDER NOTE - NS ED ROS FT
Review of Systems:  CONSTITUTIONAL: No fever, No diaphoresis, No weight change  SKIN: No rash  HEMATOLOGIC: No abnormal bleeding or bruising  EYES: No eye pain, No blurred vision  ENT: No change in hearing, No sore throat, No neck pain, No rhinorrhea, No ear pain  RESPIRATORY: No shortness of breath, No cough  CARDIAC: No chest pain, No palpitations  GI: (+) abdominal pain, (+) nausea, (+) vomiting, No diarrhea, (+) constipation, No bright red blood per rectum or melena. No flank pain  : No dysuria, frequency, hematuria.   ENDO: No polydypsia, No polyuria, No heat/cold intolerance  MUSCULOSKELETAL: No joint paint, No swelling, No back pain  NEUROLOGIC: No numbness, No focal weakness, No headache, No dizziness  All other systems negative, unless specified in HPI

## 2021-08-16 NOTE — ED PROVIDER NOTE - CARE PROVIDER_API CALL
Wendy Baird)  Obstetrics and Gynecology  [address] [facility], [address]  [place], [place] [id number]  Phone: [phone]  Fax: [phone]  Follow Up Time: Urgent

## 2021-08-16 NOTE — ED ADULT NURSE NOTE - NSFALLRSKOUTCOME_ED_ALL_ED
[FreeTextEntry1] : Low back pain [de-identified] : 85 year old male with DDD of LS spine here with acute complaints of back pain, mid center back after working outside moving things. No fall or trauma. Pain comes and goes, sharp 8/10 at its worst. No radiation or numbness. No chest pain or sob.  Universal Safety Interventions

## 2021-08-16 NOTE — ED PROVIDER NOTE - PHYSICAL EXAMINATION
VS reviewed, stable.  Gen: interactive, well appearing, no acute distress  HEENT: NC/AT, TM non bulging bl no evidence of mastoiditis,  moist mucus membranes, pupils equal, responsive, reactive to light and accomodation, no conjunctivitis or scleral icterus; no nasal discharge .   OP no exudates no erythema  Neck: FROM, supple, no cervical LAD  Chest: CTA b/l, no crackles/wheezes, good air entry, no tachypnea or retractions  CV: regular rate and rhythm, no murmurs   Abd: soft, nondistended, no HSM appreciated, +BS, neg McBurney, neg Rovsing, tender in LLQ and RLQ

## 2021-08-16 NOTE — ED PROVIDER NOTE - CLINICAL SUMMARY MEDICAL DECISION MAKING FREE TEXT BOX
Results reviewed and discussed with pt and printed for patient. Anticipatory guidance given including close outpatient followup. Strict return precautions given. Pt verbalizes understanding of and agrees with plan.

## 2021-08-16 NOTE — ED PROVIDER NOTE - PROGRESS NOTE DETAILS
TN - pt reassessed at bedside, pt tolerating zofran/toradol well. decreased nausea and pain.   pelvic exam done performed with chaperone, Kina Alejandra. purulent discharge noted from cervical os, gc culture taken. chandelier sign (+) on exam. pt aware of findings and result. TN - pt reassessed at bedside, pt tolerating zofran/toradol well. decreased nausea and pain.   pelvic exam done performed with chaperone, Kina Alejandra. purulent discharge noted from cervical os, gc culture taken. chandelier sign (+) on exam. pt aware of findings and result.    pending CT and u/s results  Plan: d/c home, abx to pharmacy [facility] on [address] [facility] Authored by Roxana Pandey DO: Patient reassessed, feeling better at this time. Results discussed. Instructed to follow up with PMD and OBGYN.

## 2021-08-16 NOTE — ED PROVIDER NOTE - OBJECTIVE STATEMENT
31 y F [id number] PMHx of constipation, s/p cholecystectomy c/o abdominal pain x2 weeks. pain is in the LLQ and radiates to the RLQ, constant, 5/10, burning in nature related to n/v x2, NBNB. pt started tylenol without relief and noted "pink-kayla" urine 3 days ago. LMP 2 2021. denies fever and diarrhea. 31 y F [id number] PMHx of constipation, s/p cholecystectomy c/o abdominal pain x2 weeks. pain is in the LLQ and radiates to the RLQ, constant, 5/10, burning in nature related to n/v x2, NBNB. pt started tylenol without relief and noted pink urine x1, 3 days ago. LMP 2 2021. denies fever and diarrhea.

## 2021-08-16 NOTE — ED PROVIDER NOTE - NSFOLLOWUPINSTRUCTIONS_ED_ALL_ED_FT
Pelvic Inflammatory Disease    Pelvic inflammatory disease (PID) refers to an infection in some or all of the female organs. The infection can be in the uterus, ovaries, fallopian tubes, or the surrounding tissues in the pelvis. PID can cause abdominal or pelvic pain that comes on suddenly (acute pelvic pain). PID is a serious infection because it can lead to lasting (chronic) pelvic pain or the inability to have children (infertility).    What are the causes?  This condition is most often caused by an infection that is spread during sexual contact. However, the infection can also be caused by the normal bacteria that are found in the vaginal tissues if these bacteria travel upward into the reproductive organs. PID can also occur following:  The birth of a baby.  A miscarriage.  An .  Major pelvic surgery.  The use of an intrauterine device (IUD).  A sexual assault.  What increases the risk?  This condition is more likely to develop in women who:  Are younger than 25 years of age.  Are sexually active at a young age.  Use nonbarrier contraception.  Have multiple sexual partners.  Have sex with someone who has symptoms of an STD (sexually transmitted disease).  Use oral contraception.  At times, certain behaviors can also increase the possibility of getting PID, such as:  Using a vaginal douche.  Having an IUD in place.  What are the signs or symptoms?  Symptoms of this condition include:  Abdominal or pelvic pain.  Fever.  Chills.  Abnormal vaginal discharge.  Abnormal uterine bleeding.  Unusual pain shortly after the end of a menstrual period.  Painful urination.  Pain with sexual intercourse.  Nausea and vomiting.  How is this diagnosed?  To diagnose this condition, your health care provider will do a physical exam and take your medical history. A pelvic exam typically reveals great tenderness in the uterus and the surrounding pelvic tissues. You may also have tests, such as:  Lab tests, including a pregnancy test, blood tests, and urine test.  Culture tests of the vagina and cervix to check for an STD.  Ultrasound.  A laparoscopic procedure to look inside the pelvis.  Examining vaginal secretions under a microscope.  How is this treated?  Treatment for this condition may involve one or more approaches.  Antibiotic medicines may be prescribed to be taken by mouth.  Sexual partners may need to be treated if the infection is caused by an STD.  For more severe cases, hospitalization may be needed to give antibiotics directly into a vein through an IV tube.  Surgery may be needed if other treatments do not help, but this is rare.  It may take weeks until you are completely well. If you are diagnosed with PID, you should also be checked for human immunodeficiency virus (HIV). Your health care provider may test you for infection again 3 months after treatment. You should not have unprotected sex.    Follow these instructions at home:  Take over-the-counter and prescription medicines only as told by your health care provider.  If you were prescribed an antibiotic medicine, take it as told by your health care provider. Do not stop taking the antibiotic even if you start to feel better.  Do not have sexual intercourse until treatment is completed or as told by your health care provider. If PID is confirmed, your recent sexual partners will need treatment, especially if you had unprotected sex.  Keep all follow-up visits as told by your health care provider. This is important.  Contact a health care provider if:  You have increased or abnormal vaginal discharge.  Your pain does not improve.  You vomit.  You have a fever.  You cannot tolerate your medicines.  Your partner has an STD.  You have pain when you urinate.  Get help right away if:  You have increased abdominal or pelvic pain.  You have chills.  Your symptoms are not better in 72 hours even with treatment.  This information is not intended to replace advice given to you by your health care provider. Make sure you discuss any questions you have with your health care provider.

## 2021-08-16 NOTE — ED PROVIDER NOTE - PATIENT PORTAL LINK FT
You can access the FollowMyHealth Patient Portal offered by [facility] by registering at the following website: [email or web address]. By joining [employer]’s FollowMyHealth portal, you will also be able to view your health information using other applications (apps) compatible with our system.

## 2021-08-18 LAB
CULTURE RESULTS: SIGNIFICANT CHANGE UP
SPECIMEN SOURCE: SIGNIFICANT CHANGE UP

## 2021-11-17 NOTE — ED ADULT NURSE NOTE - GASTROINTESTINAL WDL
Abdomen soft, nontender, nondistended, bowel sounds present in all 4 quadrants. Libtayo Pregnancy And Lactation Text: This medication is contraindicated in pregnancy and when breast feeding.

## 2021-12-08 NOTE — ED PROVIDER NOTE - NSFOLLOWUPINSTRUCTIONS_ED_ALL_ED_FT
Follow up with PMD and GI in 1-2 days.    Hemorrhoids    Hemorrhoids are swollen veins in and around the rectum or anus. There are two types of hemorrhoids:     Internal hemorrhoids. These occur in the veins that are just inside the rectum. They may poke through to the outside and become irritated and painful.  External hemorrhoids. These occur in the veins that are outside of the anus and can be felt as a painful swelling or hard lump near the anus.    Most hemorrhoids do not cause serious problems, and they can be managed with home treatments such as diet and lifestyle changes. If home treatments do not help your symptoms, procedures can be done to shrink or remove the hemorrhoids.    CAUSES  This condition is caused by increased pressure in the anal area. This pressure may result from various things, including:    Constipation.  Straining to have a bowel movement.  Diarrhea.  Pregnancy.  Obesity.  Sitting for long periods of time.  Heavy lifting or other activity that causes you to strain.  Anal sex.    SYMPTOMS  Symptoms of this condition include:    Pain.  Anal itching or irritation.  Rectal bleeding.  Leakage of stool (feces).  Anal swelling.  One or more lumps around the anus.    DIAGNOSIS  This condition can often be diagnosed through a visual exam. Other exams or tests may also be done, such as:    Examination of the rectal area with a gloved hand (digital rectal exam).  Examination of the anal canal using a small tube (anoscope).  A blood test, if you have lost a significant amount of blood.  A test to look inside the colon (sigmoidoscopy or colonoscopy).    TREATMENT  This condition can usually be treated at home. However, various procedures may be done if dietary changes, lifestyle changes, and other home treatments do not help your symptoms. These procedures can help make the hemorrhoids smaller or remove them completely. Some of these procedures involve surgery, and others do not. Common procedures include:    Rubber band ligation. Rubber bands are placed at the base of the hemorrhoids to cut off the blood supply to them.  Sclerotherapy. Medicine is injected into the hemorrhoids to shrink them.  Infrared coagulation. A type of light energy is used to get rid of the hemorrhoids.  Hemorrhoidectomy surgery. The hemorrhoids are surgically removed, and the veins that supply them are tied off.  Stapled hemorrhoidopexy surgery. A circular stapling device is used to remove the hemorrhoids and use staples to cut off the blood supply to them.    HOME CARE INSTRUCTIONS  Eating and Drinking    Eat foods that have a lot of fiber in them, such as whole grains, beans, nuts, fruits, and vegetables. Ask your health care provider about taking products that have added fiber (fiber supplements).  Drink enough fluid to keep your urine clear or pale yellow.    Managing Pain and Swelling    Take warm sitz baths for 20 minutes, 3–4 times a day to ease pain and discomfort.  If directed, apply ice to the affected area. Using ice packs between sitz baths may be helpful.  Put ice in a plastic bag.  Place a towel between your skin and the bag.  Leave the ice on for 20 minutes, 2–3 times a day.    General Instructions    Take over-the-counter and prescription medicines only as told by your health care provider.  Use medicated creams or suppositories as told.  Exercise regularly.  Go to the bathroom when you have the urge to have a bowel movement. Do not wait.  Avoid straining to have bowel movements.  Keep the anal area dry and clean. Use wet toilet paper or moist towelettes after a bowel movement.  Do not sit on the toilet for long periods of time. This increases blood pooling and pain.    SEEK MEDICAL CARE IF:  You have increasing pain and swelling that are not controlled by treatment or medicine.  You have uncontrolled bleeding.  You have difficulty having a bowel movement, or you are unable to have a bowel movement.  You have pain or inflammation outside the area of the hemorrhoids.    ADDITIONAL NOTES AND INSTRUCTIONS    Please follow up with your Primary MD in 24-48 hr.  Seek immediate medical care for any new/worsening signs or symptoms. 99

## 2021-12-18 ENCOUNTER — OUTPATIENT (OUTPATIENT)
Dept: OUTPATIENT SERVICES | Facility: HOSPITAL | Age: 31
LOS: 1 days | Discharge: HOME | End: 2021-12-18

## 2021-12-18 DIAGNOSIS — Z20.828 CONTACT WITH AND (SUSPECTED) EXPOSURE TO OTHER VIRAL COMMUNICABLE DISEASES: ICD-10-CM

## 2021-12-18 DIAGNOSIS — Z90.49 ACQUIRED ABSENCE OF OTHER SPECIFIED PARTS OF DIGESTIVE TRACT: Chronic | ICD-10-CM

## 2021-12-18 LAB — SARS-COV-2 RNA SPEC QL NAA+PROBE: SIGNIFICANT CHANGE UP

## 2022-01-04 NOTE — ED ADULT NURSE NOTE - ISOLATION TYPE:
None Glycopyrrolate Pregnancy And Lactation Text: This medication is Pregnancy Category B and is considered safe during pregnancy. It is unknown if it is excreted breast milk.

## 2022-05-16 NOTE — ED PROVIDER NOTE - NS ED MD DISPO DISCHARGE CCDA
Patient had a scheduled tele-health appointment with provider today Monday 5/16/2022 at 10:00 am. However, patient canceled his appointment this morning at 9:24 am through the [employer] PATIENT PORTAL  for the following reason: \"Too sick to Come in (Can we reschedule this I'm feeling sick this morning).\" Provider will follow up further with patient to reschedule missed appointment. Vibha Zamora NCC, LPC, SAC 5/16/2022             Patient/Caregiver provided printed discharge information.

## 2022-12-23 NOTE — ED ADULT NURSE NOTE - PMH
Problem: ABCDS Injury Assessment  Goal: Absence of physical injury  Outcome: Adequate for Discharge     Problem: Skin/Tissue Integrity  Goal: Absence of new skin breakdown  Description: 1. Monitor for areas of redness and/or skin breakdown  2. Assess vascular access sites hourly  3. Every 4-6 hours minimum:  Change oxygen saturation probe site  4. Every 4-6 hours:  If on nasal continuous positive airway pressure, respiratory therapy assess nares and determine need for appliance change or resting period.   Outcome: Adequate for Discharge     Problem: Pain  Goal: Verbalizes/displays adequate comfort level or baseline comfort level  Outcome: Adequate for Discharge  Flowsheets (Taken 12/23/2022 1022)  Verbalizes/displays adequate comfort level or baseline comfort level:   Encourage patient to monitor pain and request assistance   Assess pain using appropriate pain scale   Implement non-pharmacological measures as appropriate and evaluate response   Administer analgesics based on type and severity of pain and evaluate response     Problem: Discharge Planning  Goal: Discharge to home or other facility with appropriate resources  Outcome: Adequate for Discharge  Flowsheets (Taken 12/23/2022 1025)  Discharge to home or other facility with appropriate resources: Identify barriers to discharge with patient and caregiver     Problem: Chronic Conditions and Co-morbidities  Goal: Patient's chronic conditions and co-morbidity symptoms are monitored and maintained or improved  Outcome: Adequate for Discharge  Flowsheets (Taken 12/23/2022 1025)  Care Plan - Patient's Chronic Conditions and Co-Morbidity Symptoms are Monitored and Maintained or Improved:   Monitor and assess patient's chronic conditions and comorbid symptoms for stability, deterioration, or improvement   Collaborate with multidisciplinary team to address chronic and comorbid conditions and prevent exacerbation or deterioration Rheumatic fever/heart disease

## 2023-01-06 NOTE — ED ADULT NURSE NOTE - NS ED NURSE RECORD ANOTHER VITAL SIGN
Subjective:       Patient ID: Jama Salcedo is a 26 y.o. male.    Chief Complaint: No chief complaint on file.    HPI  Review of Systems      Objective:      Physical Exam    Assessment:       Problem List Items Addressed This Visit    None  Visit Diagnoses       Encounter for physical examination related to employment    -  Primary            Plan:       See scanned documents in .            Yes

## 2023-04-16 ENCOUNTER — EMERGENCY (EMERGENCY)
Facility: HOSPITAL | Age: 33
LOS: 0 days | Discharge: HOME | End: 2023-04-16
Attending: STUDENT IN AN ORGANIZED HEALTH CARE EDUCATION/TRAINING PROGRAM
Payer: MEDICAID

## 2023-04-16 VITALS
SYSTOLIC BLOOD PRESSURE: 119 MMHG | RESPIRATION RATE: 18 BRPM | OXYGEN SATURATION: 99 % | TEMPERATURE: 97 F | HEART RATE: 62 BPM | DIASTOLIC BLOOD PRESSURE: 72 MMHG | WEIGHT: 229.94 LBS

## 2023-04-16 DIAGNOSIS — M79.661 PAIN IN RIGHT LOWER LEG: ICD-10-CM

## 2023-04-16 DIAGNOSIS — Z90.49 ACQUIRED ABSENCE OF OTHER SPECIFIED PARTS OF DIGESTIVE TRACT: ICD-10-CM

## 2023-04-16 DIAGNOSIS — Z90.49 ACQUIRED ABSENCE OF OTHER SPECIFIED PARTS OF DIGESTIVE TRACT: Chronic | ICD-10-CM

## 2023-04-16 DIAGNOSIS — M79.89 OTHER SPECIFIED SOFT TISSUE DISORDERS: ICD-10-CM

## 2023-04-16 PROCEDURE — 99284 EMERGENCY DEPT VISIT MOD MDM: CPT | Mod: 25

## 2023-04-16 PROCEDURE — 93970 EXTREMITY STUDY: CPT | Mod: 26

## 2023-04-16 PROCEDURE — 99284 EMERGENCY DEPT VISIT MOD MDM: CPT

## 2023-04-16 PROCEDURE — 93970 EXTREMITY STUDY: CPT

## 2023-04-16 PROCEDURE — 96372 THER/PROPH/DIAG INJ SC/IM: CPT

## 2023-04-16 RX ORDER — KETOROLAC TROMETHAMINE 30 MG/ML
30 SYRINGE (ML) INJECTION ONCE
Refills: 0 | Status: DISCONTINUED | OUTPATIENT
Start: 2023-04-16 | End: 2023-04-16

## 2023-04-16 RX ORDER — DEXAMETHASONE 0.5 MG/5ML
10 ELIXIR ORAL ONCE
Refills: 0 | Status: COMPLETED | OUTPATIENT
Start: 2023-04-16 | End: 2023-04-16

## 2023-04-16 RX ORDER — METHOCARBAMOL 500 MG/1
2 TABLET, FILM COATED ORAL
Qty: 20 | Refills: 0
Start: 2023-04-16 | End: 2023-04-20

## 2023-04-16 RX ADMIN — Medication 30 MILLIGRAM(S): at 17:56

## 2023-04-16 RX ADMIN — Medication 30 MILLIGRAM(S): at 17:26

## 2023-04-16 RX ADMIN — Medication 10 MILLIGRAM(S): at 17:26

## 2023-04-16 NOTE — ED PROVIDER NOTE - NSFOLLOWUPCLINICS_GEN_ALL_ED_FT
[facility] ([facility])  Rehabilitation  [facility] [address] [place] [address]  Phone: [phone]  Fax:

## 2023-04-16 NOTE — ED PROVIDER NOTE - CARE PROVIDER_API CALL
Shweta Barba)  Surgery  Neurosurgery  [address] [place] [address], [address]  [place] [address]  Phone: [phone]  Fax: [phone]  Follow Up Time:

## 2023-04-16 NOTE — ED PROVIDER NOTE - PATIENT PORTAL LINK FT
You can access the FollowMyHealth Patient Portal offered by [facility] by registering at the following website: [email or web address]. By joining [employer]’s FollowMyHealth portal, you will also be able to view your health information using other applications (apps) compatible with our system.

## 2023-04-16 NOTE — ED PROVIDER NOTE - PHYSICAL EXAMINATION
CONSTITUTIONAL: Well-appearing; well-nourished; in no apparent distress.   EYES: PERRL; EOM intact.   ENT: normal nose; no rhinorrhea; normal pharynx with no tonsillar hypertrophy.   NECK: Supple; non-tender; no cervical lymphadenopathy.   CARDIOVASCULAR: Normal S1, S2; no murmurs, rubs, or gallops. Equal radial pulses  RESPIRATORY: Normal chest excursion with respiration; breath sounds clear and equal bilaterally; no wheezes, rhonchi, or rales.  GI/: Normal bowel sounds; non-distended; non-tender; no palpable organomegaly.   MS: No evidence of trauma or deformity. Normal ROM in all four extremities; no midline spinal ttp. + ttp to sciatic notch. Pedal pulses intact  SKIN: Normal for age and race; warm; dry; good turgor; no apparent lesions or exudate.   NEURO/PSYCH: A & O x 4; grossly unremarkable. mood and manner are appropriate. Grooming and personal hygiene are appropriate.

## 2023-04-16 NOTE — ED ADULT NURSE NOTE - OBJECTIVE STATEMENT
32 year old female complaining of right lower leg pain x2-3 weeks. Pt states her right foot became swollen yesterday. Pt denies fall/trauma

## 2023-04-16 NOTE — ED PROVIDER NOTE - CLINICAL SUMMARY MEDICAL DECISION MAKING FREE TEXT BOX
.    31 y/o F w/ atraumatic R leg pain x 2wks. + rad/ paresthesia down buttock and posterior thigh. No weakness, numbness, back pain, or bowel/ bladder issues. Exam as noted. NL motor and sensory. Sx improved w/ medication. LE doppler neg for DVT. Pt ambulatory. IMP: leg pain. Pt stable for dc w/ outpt f/up, and care as discussed.  Pt understands plan and signs and symptoms for ED return. DC home.     .

## 2023-06-13 NOTE — ED ADULT NURSE NOTE - PAIN RATING/NUMBER SCALE (0-10): ACTIVITY
IUP 39+1  patient admitted to room 233. Prenatal record reviewed.   OB History    Para Term  AB Living   3 2 2 0 0 2   SAB IAB Ectopic Multiple Live Births   0 0 0 0 2      # Outcome Date GA Lbr Obdulio/2nd Weight Sex Delivery Anes PTL Lv   3 Current            2 Term 10/26/20 39w1d 02:25 / 00:15 2.91 kg (6 lb 6.7 oz) M Vag-Spont EPI N AIMEE      Name: SHANNAMALE-ESTEPHANIE      Apgar1: 9  Apgar5: 9   1 Term 18 38w1d 02:10  00:25 2.38 kg (5 lb 4 oz) F Vag-Spont EPI  AIMEE      Name: JAMIE OTERO      Apgar1: 8  Apgar5: 9     Medical History:   Past Medical History:   Diagnosis Date    Depressive disorder     Heroin abuse (H)     Treatment      Vital signs per doc flowsheet. Fetal movement present. Patient reports Induction Of Labor   as reason for admission. Support persons Alfred present. Verbal consent for EFM, external fetal monitors applied. Admission assessment completed. Patient and support persons educated on labor process. Patient instructed to report change in fetal movement, contractions, vaginal leaking of fluid or bleeding, abdominal pain, or any concerns related to the pregnancy to her nurse/physician. Patient oriented to room, call light in reach. Dr. Thomas informed of patient's arrival, VS, FHT, Uterine activity, SVE, and BS. Plan per provider is cervical ripening with Misoprostol PO. . Patient verbalized understanding of education and verbalized agreement with plan. Patient coping with labor via.             5

## 2023-11-03 NOTE — ED ADULT TRIAGE NOTE - RESPIRATORY RATE (BREATHS/MIN)
I was present at patient's appointment with Dr. Mullins. Patient was provided with a copy of consents to review prior to visit with Dr. Mullins. Patient was given a copy of his calendar.    18

## 2024-01-16 ENCOUNTER — EMERGENCY (EMERGENCY)
Facility: HOSPITAL | Age: 34
LOS: 0 days | Discharge: HOME | End: 2024-01-16
Attending: EMERGENCY MEDICINE
Payer: MEDICAID

## 2024-01-16 VITALS
RESPIRATION RATE: 17 BRPM | OXYGEN SATURATION: 98 % | DIASTOLIC BLOOD PRESSURE: 69 MMHG | SYSTOLIC BLOOD PRESSURE: 118 MMHG | HEART RATE: 70 BPM | TEMPERATURE: 98 F

## 2024-01-16 VITALS
OXYGEN SATURATION: 96 % | TEMPERATURE: 98 F | RESPIRATION RATE: 18 BRPM | HEIGHT: 67 IN | HEART RATE: 68 BPM | WEIGHT: 229.94 LBS | SYSTOLIC BLOOD PRESSURE: 124 MMHG | DIASTOLIC BLOOD PRESSURE: 73 MMHG

## 2024-01-16 DIAGNOSIS — Z90.49 ACQUIRED ABSENCE OF OTHER SPECIFIED PARTS OF DIGESTIVE TRACT: Chronic | ICD-10-CM

## 2024-01-16 DIAGNOSIS — R07.9 CHEST PAIN, UNSPECIFIED: ICD-10-CM

## 2024-01-16 DIAGNOSIS — R10.9 UNSPECIFIED ABDOMINAL PAIN: ICD-10-CM

## 2024-01-16 DIAGNOSIS — K52.9 NONINFECTIVE GASTROENTERITIS AND COLITIS, UNSPECIFIED: ICD-10-CM

## 2024-01-16 DIAGNOSIS — R11.2 NAUSEA WITH VOMITING, UNSPECIFIED: ICD-10-CM

## 2024-01-16 LAB
ALBUMIN SERPL ELPH-MCNC: 4 G/DL — SIGNIFICANT CHANGE UP (ref 3.5–5.2)
ALP SERPL-CCNC: 51 U/L — SIGNIFICANT CHANGE UP (ref 30–115)
ALT FLD-CCNC: 13 U/L — SIGNIFICANT CHANGE UP (ref 0–41)
ANION GAP SERPL CALC-SCNC: 7 MMOL/L — SIGNIFICANT CHANGE UP (ref 7–14)
AST SERPL-CCNC: 16 U/L — SIGNIFICANT CHANGE UP (ref 0–41)
BASOPHILS # BLD AUTO: 0.04 K/UL — SIGNIFICANT CHANGE UP (ref 0–0.2)
BASOPHILS NFR BLD AUTO: 1 % — SIGNIFICANT CHANGE UP (ref 0–1)
BILIRUB SERPL-MCNC: 0.3 MG/DL — SIGNIFICANT CHANGE UP (ref 0.2–1.2)
BUN SERPL-MCNC: 10 MG/DL — SIGNIFICANT CHANGE UP (ref 10–20)
CALCIUM SERPL-MCNC: 9 MG/DL — SIGNIFICANT CHANGE UP (ref 8.4–10.5)
CHLORIDE SERPL-SCNC: 107 MMOL/L — SIGNIFICANT CHANGE UP (ref 98–110)
CO2 SERPL-SCNC: 27 MMOL/L — SIGNIFICANT CHANGE UP (ref 17–32)
CREAT SERPL-MCNC: 0.7 MG/DL — SIGNIFICANT CHANGE UP (ref 0.7–1.5)
EGFR: 117 ML/MIN/1.73M2 — SIGNIFICANT CHANGE UP
EOSINOPHIL # BLD AUTO: 0.3 K/UL — SIGNIFICANT CHANGE UP (ref 0–0.7)
EOSINOPHIL NFR BLD AUTO: 7.3 % — SIGNIFICANT CHANGE UP (ref 0–8)
GLUCOSE SERPL-MCNC: 93 MG/DL — SIGNIFICANT CHANGE UP (ref 70–99)
HCG UR QL: NEGATIVE — SIGNIFICANT CHANGE UP
HCT VFR BLD CALC: 36.2 % — LOW (ref 37–47)
HGB BLD-MCNC: 12.2 G/DL — SIGNIFICANT CHANGE UP (ref 12–16)
IMM GRANULOCYTES NFR BLD AUTO: 0.2 % — SIGNIFICANT CHANGE UP (ref 0.1–0.3)
LYMPHOCYTES # BLD AUTO: 1.19 K/UL — LOW (ref 1.2–3.4)
LYMPHOCYTES # BLD AUTO: 29.1 % — SIGNIFICANT CHANGE UP (ref 20.5–51.1)
MCHC RBC-ENTMCNC: 29.2 PG — SIGNIFICANT CHANGE UP (ref 27–31)
MCHC RBC-ENTMCNC: 33.7 G/DL — SIGNIFICANT CHANGE UP (ref 32–37)
MCV RBC AUTO: 86.6 FL — SIGNIFICANT CHANGE UP (ref 81–99)
MONOCYTES # BLD AUTO: 0.49 K/UL — SIGNIFICANT CHANGE UP (ref 0.1–0.6)
MONOCYTES NFR BLD AUTO: 12 % — HIGH (ref 1.7–9.3)
NEUTROPHILS # BLD AUTO: 2.06 K/UL — SIGNIFICANT CHANGE UP (ref 1.4–6.5)
NEUTROPHILS NFR BLD AUTO: 50.4 % — SIGNIFICANT CHANGE UP (ref 42.2–75.2)
NRBC # BLD: 0 /100 WBCS — SIGNIFICANT CHANGE UP (ref 0–0)
PLATELET # BLD AUTO: 231 K/UL — SIGNIFICANT CHANGE UP (ref 130–400)
PMV BLD: 11.1 FL — HIGH (ref 7.4–10.4)
POTASSIUM SERPL-MCNC: 3.6 MMOL/L — SIGNIFICANT CHANGE UP (ref 3.5–5)
POTASSIUM SERPL-SCNC: 3.6 MMOL/L — SIGNIFICANT CHANGE UP (ref 3.5–5)
PROT SERPL-MCNC: 6.5 G/DL — SIGNIFICANT CHANGE UP (ref 6–8)
RBC # BLD: 4.18 M/UL — LOW (ref 4.2–5.4)
RBC # FLD: 14.6 % — HIGH (ref 11.5–14.5)
SODIUM SERPL-SCNC: 141 MMOL/L — SIGNIFICANT CHANGE UP (ref 135–146)
WBC # BLD: 4.09 K/UL — LOW (ref 4.8–10.8)
WBC # FLD AUTO: 4.09 K/UL — LOW (ref 4.8–10.8)

## 2024-01-16 PROCEDURE — 81025 URINE PREGNANCY TEST: CPT

## 2024-01-16 PROCEDURE — G1004: CPT

## 2024-01-16 PROCEDURE — 93010 ELECTROCARDIOGRAM REPORT: CPT

## 2024-01-16 PROCEDURE — 80053 COMPREHEN METABOLIC PANEL: CPT

## 2024-01-16 PROCEDURE — 71046 X-RAY EXAM CHEST 2 VIEWS: CPT

## 2024-01-16 PROCEDURE — 71046 X-RAY EXAM CHEST 2 VIEWS: CPT | Mod: 26

## 2024-01-16 PROCEDURE — 93005 ELECTROCARDIOGRAM TRACING: CPT

## 2024-01-16 PROCEDURE — 74177 CT ABD & PELVIS W/CONTRAST: CPT | Mod: MG

## 2024-01-16 PROCEDURE — 85025 COMPLETE CBC W/AUTO DIFF WBC: CPT

## 2024-01-16 PROCEDURE — 74177 CT ABD & PELVIS W/CONTRAST: CPT | Mod: 26,MG

## 2024-01-16 PROCEDURE — 96375 TX/PRO/DX INJ NEW DRUG ADDON: CPT

## 2024-01-16 PROCEDURE — 99285 EMERGENCY DEPT VISIT HI MDM: CPT | Mod: 25

## 2024-01-16 PROCEDURE — 96374 THER/PROPH/DIAG INJ IV PUSH: CPT | Mod: XU

## 2024-01-16 PROCEDURE — 99285 EMERGENCY DEPT VISIT HI MDM: CPT

## 2024-01-16 PROCEDURE — 36415 COLL VENOUS BLD VENIPUNCTURE: CPT

## 2024-01-16 RX ORDER — ACETAMINOPHEN 500 MG
1000 TABLET ORAL ONCE
Refills: 0 | Status: COMPLETED | OUTPATIENT
Start: 2024-01-16 | End: 2024-01-16

## 2024-01-16 RX ORDER — SODIUM CHLORIDE 9 MG/ML
1000 INJECTION, SOLUTION INTRAVENOUS
Refills: 0 | Status: DISCONTINUED | OUTPATIENT
Start: 2024-01-16 | End: 2024-01-16

## 2024-01-16 RX ORDER — SUCRALFATE 1 G
1 TABLET ORAL ONCE
Refills: 0 | Status: COMPLETED | OUTPATIENT
Start: 2024-01-16 | End: 2024-01-16

## 2024-01-16 RX ORDER — FAMOTIDINE 10 MG/ML
20 INJECTION INTRAVENOUS ONCE
Refills: 0 | Status: COMPLETED | OUTPATIENT
Start: 2024-01-16 | End: 2024-01-16

## 2024-01-16 RX ORDER — ACETAMINOPHEN 500 MG
3 TABLET ORAL
Qty: 30 | Refills: 0
Start: 2024-01-16

## 2024-01-16 RX ADMIN — SODIUM CHLORIDE 125 MILLILITER(S): 9 INJECTION, SOLUTION INTRAVENOUS at 10:20

## 2024-01-16 RX ADMIN — Medication 400 MILLIGRAM(S): at 10:45

## 2024-01-16 RX ADMIN — FAMOTIDINE 20 MILLIGRAM(S): 10 INJECTION INTRAVENOUS at 10:21

## 2024-01-16 RX ADMIN — Medication 1000 MILLIGRAM(S): at 11:00

## 2024-01-16 RX ADMIN — Medication 1 GRAM(S): at 10:21

## 2024-01-16 NOTE — ED PROVIDER NOTE - ATTENDING CONTRIBUTION TO CARE
33-year-old female to ED with abdominal pain.  Patient complaining of nausea vomiting abdominal pain and diarrhea.  Patient's symptoms been going on for 3 days.  Symptoms started when she finished 2 L of tequila and has been persisting.  Today was able to get her child being taken care of and so came in to the ED for evaluation.  Otherwise well-appearing nontoxic.  Afebrile vital signs stable exam as noted clear lungs bilaterally conjunctiva pink HEENT normal, regular rate and rhythm abdomen soft left lower quadrant tenderness and neuro nonfocal.

## 2024-01-16 NOTE — ED PROVIDER NOTE - PROGRESS NOTE DETAILS
Pt states abdominal pain has improved and nausea has now resolved, states appetite has returned. CT A/P shows no acute/inflammatory processes, CXR neg. EKG NSR. Pt endorses feeling better and desires to be discharged at this time. Pain meds sent to pharmacy.

## 2024-01-16 NOTE — ED ADULT TRIAGE NOTE - BP NONINVASIVE SYSTOLIC (MM HG)
09/21/22 0757   Patient Observation   Heart Rate 87   Resp 20   SpO2 99 %   Vent Information   Vent Mode (S)  CPAP/PS   Ventilator Settings   FiO2  30 %   PEEP/CPAP (cmH2O) 5   Pressure Support (cm H2O) 10 cm H2O   Vent Patient Data (Readings)   Vt (Measured) 536 mL   Peak Inspiratory Pressure (cmH2O) 15 cmH2O   Rate Measured 21 br/min   Minute Volume (L/min) 9.25 Liters   Mean Airway Pressure (cmH2O) 8.5 cmH20   I:E Ratio 1:1.60   Vent Alarm Settings   Low Minute Volume (lpm) 2 L/min   RR High (bpm) 40 br/min 124

## 2024-01-16 NOTE — ED ADULT NURSE NOTE - NSFALLUNIVINTERV_ED_ALL_ED
Bed/Stretcher in lowest position, wheels locked, appropriate side rails in place/Call bell, personal items and telephone in reach/Instruct patient to call for assistance before getting out of bed/chair/stretcher/Non-slip footwear applied when patient is off stretcher/[place] to call system/Physically safe environment - no spills, clutter or unnecessary equipment/Purposeful proactive rounding/Room/bathroom lighting operational, light cord in reach Bed/Stretcher in lowest position, wheels locked, appropriate side rails in place/Call bell, personal items and telephone in reach/Instruct patient to call for assistance before getting out of bed/chair/stretcher/Non-slip footwear applied when patient is off stretcher/[place] to call system/Physically safe environment - no spills, clutter or unnecessary equipment/Purposeful proactive rounding/Room/bathroom lighting operational, light cord in reach

## 2024-01-16 NOTE — ED PROVIDER NOTE - PATIENT PORTAL LINK FT
You can access the FollowMyHealth Patient Portal offered by [facility] by registering at the following website: [email or web address]. By joining [employer]’s FollowMyHealth portal, you will also be able to view your health information using other applications (apps) compatible with our system. You can access the FollowMyHealth Patient Portal offered by [facility] by registering at the following website: [email or web address]. By joining [employer]’s FollowMyHealth portal, you will also be able to view your health information using other applications (apps) compatible with our system.

## 2024-01-16 NOTE — ED ADULT NURSE NOTE - OBJECTIVE STATEMENT
33 year old female complaining of abdominal pain and chest pain since Saturday. Pt complains of n/v/d, denies fever

## 2024-01-16 NOTE — ED PROVIDER NOTE - OBJECTIVE STATEMENT
32yo F , LMP 24, PMHx recurrent BV,  presents for chest pain, abdominal pain, nausea/vomiting, & diarrhea of 3 days duration. Patient was heavily drinking (2L of tequila) on Friday night, the next morning states noting generalized abdominal pain of 6/10 intensity with concurrent nausea/vomiting and watery diarrhea. States since then abdominal pain has become severe with intensity 9/10 currently and more localized to LLQ with radiation to midline lower abdomen. Took PO Tylenol yesterday with no relief of pain. Endorses unresolved nausea/vomiting and unable to tolerate any PO, had 3-4 episodes of vomiting yesterday. Pt also complains of achy left sided chest pain that radiates to the left armpit, states pain is exacerbated when lying down and relieved with movement and massaging the area. Denies fevers/chills, lightheadedness/dizziness, palpitations, SOB, abnormal vaginal discharge/bleeding, or urinary symptoms. 34yo F , LMP 24, PMHx recurrent BV,  presents for chest pain, abdominal pain, nausea/vomiting, & diarrhea of 3 days duration. Patient was heavily drinking (2L of tequila) on Friday night, the next morning states noting generalized abdominal pain of 6/10 intensity with concurrent nausea/vomiting and watery diarrhea. States since then abdominal pain has become severe with intensity 9/10 currently and more localized to LLQ with radiation to midline lower abdomen. Took PO Tylenol yesterday with no relief of pain. Endorses unresolved nausea/vomiting and unable to tolerate any PO, had 3-4 episodes of vomiting yesterday. Pt also complains of achy left sided chest pain that radiates to the left armpit, states pain is exacerbated when lying down and relieved with movement and massaging the area. Denies fevers/chills, lightheadedness/dizziness, palpitations, SOB, abnormal vaginal discharge/bleeding, or urinary symptoms.

## 2024-01-16 NOTE — ED PROVIDER NOTE - PHYSICAL EXAMINATION
T(C): 36.8 (01-16-24 @ 08:39), Max: 36.8 (01-16-24 @ 08:39)  HR: 68 (01-16-24 @ 08:39) (68 - 68)  BP: 124/73 (01-16-24 @ 08:39) (124/73 - 124/73)  RR: 18 (01-16-24 @ 08:39) (18 - 18)  SpO2: 96% (01-16-24 @ 08:39) (96% - 96%)    CONSTITUTIONAL: Well groomed, no apparent distress  EYES: PERRLA and symmetric, EOMI, No conjunctival or scleral injection, non-icteric  ENMT: Oral mucosa with moist membranes.   RESP: No respiratory distress, no use of accessory muscles; CTA b/l, no WRR  CV: RRR, +S1S2, no MRG; no peripheral edema  GI: Soft, mild tenderness to palpation of epigastric and periumbilical region, ND, no rebound, no guarding; no palpable masses  SKIN: No rashes or ulcers noted; no subcutaneous nodules or induration palpable

## 2024-03-21 ENCOUNTER — APPOINTMENT (OUTPATIENT)
Dept: ORTHOPEDIC SURGERY | Facility: CLINIC | Age: 34
End: 2024-03-21
Payer: MEDICAID

## 2024-03-21 PROCEDURE — 29125 APPL SHORT ARM SPLINT STATIC: CPT | Mod: LT

## 2024-03-21 PROCEDURE — 99203 OFFICE O/P NEW LOW 30 MIN: CPT | Mod: 25
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
## 2024-03-21 NOTE — IMAGING
[de-identified] : Examination of the left hand, patient has swelling, patient has extension lag over the fourth digit. Significant tenderness with any slight movement of the hand. Neurovascular intact.  X-ray of the left hand was read by the patient and CD, these x-ray was saved in our system, x-ray shows that the splays fracture over for the fourth metacarpal shaft, there is shortening of the fourth metacarpal.
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
## 2024-03-22 ENCOUNTER — APPOINTMENT (OUTPATIENT)
Dept: ORTHOPEDIC SURGERY | Facility: CLINIC | Age: 34
End: 2024-03-22
Payer: MEDICAID

## 2024-03-22 PROCEDURE — 99202 OFFICE O/P NEW SF 15 MIN: CPT
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
## 2024-03-22 NOTE — DATA REVIEWED
[FreeTextEntry1] : Radiographs 3 views of the left hand are reviewed from an outside facility documenting shortening and displacement of the left ring metacarpal shaft fracture.
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
## 2024-03-22 NOTE — PHYSICAL EXAM
[de-identified] : Patient has shortening of the ring metacarpal.  I took her out of her splint.  She has no rotational abnormality.  There is normal sensation normal cap refill.  Fracture deformity is palpable proximal

## 2024-03-28 ENCOUNTER — OUTPATIENT (OUTPATIENT)
Dept: OUTPATIENT SERVICES | Facility: HOSPITAL | Age: 34
LOS: 1 days | Discharge: HOME | End: 2024-03-28
Payer: MEDICAID

## 2024-03-28 ENCOUNTER — TRANSCRIPTION ENCOUNTER (OUTPATIENT)
Age: 34
End: 2024-03-28

## 2024-03-28 ENCOUNTER — APPOINTMENT (OUTPATIENT)
Dept: ORTHOPEDIC SURGERY | Facility: HOSPITAL | Age: 34
End: 2024-03-28

## 2024-03-28 VITALS — HEART RATE: 78 BPM | RESPIRATION RATE: 17 BRPM | DIASTOLIC BLOOD PRESSURE: 75 MMHG | SYSTOLIC BLOOD PRESSURE: 145 MMHG

## 2024-03-28 VITALS
TEMPERATURE: 97 F | RESPIRATION RATE: 18 BRPM | HEIGHT: 67 IN | HEART RATE: 62 BPM | WEIGHT: 229.94 LBS | DIASTOLIC BLOOD PRESSURE: 72 MMHG | OXYGEN SATURATION: 96 % | SYSTOLIC BLOOD PRESSURE: 115 MMHG

## 2024-03-28 DIAGNOSIS — S62.325A DISPLACED FRACTURE OF SHAFT OF FOURTH METACARPAL BONE, LEFT HAND, INITIAL ENCOUNTER FOR CLOSED FRACTURE: ICD-10-CM

## 2024-03-28 DIAGNOSIS — Z90.49 ACQUIRED ABSENCE OF OTHER SPECIFIED PARTS OF DIGESTIVE TRACT: Chronic | ICD-10-CM

## 2024-03-28 PROCEDURE — C1713: CPT

## 2024-03-28 PROCEDURE — 26615 TREAT METACARPAL FRACTURE: CPT | Mod: F3

## 2024-03-28 RX ORDER — OXYCODONE AND ACETAMINOPHEN 5; 325 MG/1; MG/1
1 TABLET ORAL
Qty: 20 | Refills: 0
Start: 2024-03-28 | End: 2024-04-01

## 2024-03-28 RX ORDER — HYDROMORPHONE HYDROCHLORIDE 2 MG/ML
0.5 INJECTION INTRAMUSCULAR; INTRAVENOUS; SUBCUTANEOUS
Refills: 0 | Status: DISCONTINUED | OUTPATIENT
Start: 2024-03-28 | End: 2024-03-28

## 2024-03-28 RX ORDER — OXYCODONE AND ACETAMINOPHEN 5; 325 MG/1; MG/1
2 TABLET ORAL ONCE
Refills: 0 | Status: DISCONTINUED | OUTPATIENT
Start: 2024-03-28 | End: 2024-03-28

## 2024-03-28 RX ORDER — ONDANSETRON 8 MG/1
4 TABLET, FILM COATED ORAL ONCE
Refills: 0 | Status: DISCONTINUED | OUTPATIENT
Start: 2024-03-28 | End: 2024-03-28

## 2024-03-28 RX ORDER — IBUPROFEN 200 MG
1 TABLET ORAL
Qty: 20 | Refills: 0
Start: 2024-03-28

## 2024-03-28 RX ORDER — SODIUM CHLORIDE 9 MG/ML
1000 INJECTION, SOLUTION INTRAVENOUS
Refills: 0 | Status: DISCONTINUED | OUTPATIENT
Start: 2024-03-28 | End: 2024-03-28

## 2024-03-28 NOTE — BRIEF OPERATIVE NOTE - NSICDXBRIEFPOSTOP_GEN_ALL_CORE_FT
POST-OP DIAGNOSIS:  Fracture of shaft of fourth metacarpal bone of left hand 28-Mar-2024 07:51:06  Jeffrey Jarquin

## 2024-03-28 NOTE — ASU PREOP CHECKLIST - AS BP NONINV METHOD
Anesthesia Post-op Note    Patient: Kayleen Catherine  Procedure(s) Performed: ROBOTIC INCISIONAL HERNIA REPAIR (Abdomen)  Anesthesia type: General    Vitals Value Taken Time   Temp 37 02/13/24 1423   Pulse 102 02/13/24 1422   Resp 25 02/13/24 1422   SpO2 97 % 02/13/24 1422   /89 02/13/24 1420   Vitals shown include unvalidated device data.      Patient Location: PACU Phase 1  Post-op Vital Signs:stable  Level of Consciousness: awake and alert  Respiratory Status: spontaneous ventilation  Cardiovascular stable  Hydration: euvolemic  Pain Management: adequately controlled  Handoff: Handoff to receiving clinician was performed and questions were answered  Vomiting: none  Nausea: None  Airway Patency:patent  Post-op Assessment: no complications and patient tolerated procedure well      No notable events documented.                       electronic

## 2024-03-28 NOTE — ASU PATIENT PROFILE, ADULT - PAIN RATING AT REST
Telephone call to patient  Advised patient that I was still working with IT to be fully reinstated in [employer]  Access to the software has been successfully total my computer however I still a m  waiting for password and ID for log in  Advised the patient that I will reach out as soon as I have had a chance to review the records  Patient expressed understanding and no further questions at this time 
7

## 2024-03-28 NOTE — ASU PATIENT PROFILE, ADULT - NS SC CAGE ALCOHOL GUILTY ABOUT
Victoria Rodriguez 31  [facility]  [facility]  [address], [address] ,[address], [address]  Phone: [phone]  Fax: [phone] / [address] [address] PHYSICAL THERAPY SERVICES  Patient Name: Calderon Segla : 1950   Medical   Diagnosis: Left hip pain [M25.552] Treatment Diagnosis: S/P L JAROD   Onset Date: 19     [facility]   Prior Hospitalization: See medical history Provider #: [id number]   Prior Level of Function: Tennis, amb without an AD   Comorbidities: arthritis   Medications: Verified on Patient Summary List   The Plan of Care and following information is based on the information from the initial evaluation.   ===========================================================================================  Assessment / key information: Patient is a 71 y.o. male who presents to [facility] at [facility] S/P L JAR[facility] ([address]) on 19. The patient reports he received HHPT and is currently walking with SPC on R. His main c/o is weakness when walking, requiring use of SPC, inability to raise up on toes and reach OH shelf and significant swelling in B feet/ankles since surgery. He elevates his feet regularly to help manage swelling but reports only temporary relief. Objective PT examination findings include:  PROM: L hip flex 105°, ER/IR in sitting 20/18°  MMT: L hip flex 3-/5, hip ext 3+/5, hip abd 3-/5, B PF 3-/5  MLT: dec B HS, gastroc and L psoas    Palpation: pitting edema along B ankles and feet  Special Tests: L SLS 10s (contralateral hip drop)    A home exercise program was demonstrated and provided to address the above objective and functional deficits. Patient can benefit from skilled PT interventions to improve ROM, decrease pain, to facilitate performance of ADLs & improve overall functional status. ===========================================================================================  Eval Complexity: History MEDIUM  Complexity : 1-2 comorbidities / personal factors will impact the outcome/ POC ;  Examination  MEDIUM Complexity : 3 Standardized tests and measures addressing body structure, function, activity limitation and / or participation in recreation ; Presentation MEDIUM Complexity : Evolving with changing characteristics ; Decision Making MEDIUM Complexity : FOTO score of 26-74; Overall Complexity MEDIUM  Problem List: pain affecting function, decrease ROM, decrease strength, edema affecting function, impaired gait/ balance, decrease ADL/ functional abilitiies, decrease activity tolerance, decrease flexibility/ joint mobility and decrease transfer abilities, FOTO score 31  Treatment Plan may include any combination of the following: Therapeutic exercise, Therapeutic activities, Neuromuscular re-education, Physical agent/modality, Gait/balance training, Manual therapy including dry needling, Aquatic therapy and Patient education  Patient / Family readiness to learn indicated by: asking questions, trying to perform skills and interest  Persons(s) to be included in education: patient (P)  Barriers to Learning/Limitations: no  Measures taken:    Patient Goal (s): \"walk better\"   Patient self reported health status: fair  Rehabilitation Potential: good   Short Term Goals: To be accomplished in  1-2  weeks:  1) Patient to be independent and compliant with initial HEP to prevent further disability. 2) Improve FOTO score to 41 indicating significant functional improvement and ease with tranfers   3) Patient will ambulate without AD for 100ft, demonstrating normal gait mechanics.  Long Term Goals: To be accomplished in  3-4  weeks:  1) Patient to be independent & compliant with a progressive, high level HEP in order to maintain gains made in physical therapy.    2) Improve FOTO score to 52 indicating significant functional improvement in order to return to PLOF. 3) Improve hip abd/ext strength to 4/5 MMT in order to negotiate stairs using reciprocal gait pattern. Frequency / Duration:   Patient to be seen  2-3  times per week for 3-4  weeks:  Patient / Caregiver education and instruction: self care, activity modification and exercises    Therapist Signature: Bakari Buenrostro, PT, DPT, MTC, CMTPT Date: 8/8/0839   Certification Period: 7/8/19 to to 10/6/19 Time: 7:22 PM   ===========================================================================================  I certify that the above Physical Therapy Services are being furnished while the patient is under my care. I agree with the treatment plan and certify that this therapy is necessary. Physician Signature:        Date:       Time:     Please sign and return to [facility] or you may fax the signed copy to [phone]. Thank you. no

## 2024-03-28 NOTE — BRIEF OPERATIVE NOTE - NSICDXBRIEFPREOP_GEN_ALL_CORE_FT
PRE-OP DIAGNOSIS:  Fracture of shaft of fourth metacarpal bone of left hand 28-Mar-2024 07:50:51  Jefrfey Jarquin

## 2024-03-28 NOTE — ASU PATIENT PROFILE, ADULT - FALL HARM RISK - TYPE OF ASSESSMENT
Detail Level: Detailed Add 92707 Cpt? (Important Note: In 2017 The Use Of 57131 Is Being Tracked By Cms To Determine Future Global Period Reimbursement For Global Periods): yes Admission

## 2024-04-02 DIAGNOSIS — S62.325A DISPLACED FRACTURE OF SHAFT OF FOURTH METACARPAL BONE, LEFT HAND, INITIAL ENCOUNTER FOR CLOSED FRACTURE: ICD-10-CM
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
## 2024-04-08 ENCOUNTER — RESULT CHARGE (OUTPATIENT)
Age: 34
End: 2024-04-08

## 2024-04-08 ENCOUNTER — APPOINTMENT (OUTPATIENT)
Dept: ORTHOPEDIC SURGERY | Facility: CLINIC | Age: 34
End: 2024-04-08
Payer: MEDICAID

## 2024-04-08 DIAGNOSIS — S62.325A DISPLACED FRACTURE OF SHAFT OF FOURTH METACARPAL BONE, LEFT HAND, INITIAL ENCOUNTER FOR CLOSED FRACTURE: ICD-10-CM

## 2024-04-08 PROCEDURE — 73130 X-RAY EXAM OF HAND: CPT | Mod: LT

## 2024-04-08 PROCEDURE — 99024 POSTOP FOLLOW-UP VISIT: CPT

## 2024-04-08 NOTE — PHYSICAL EXAM
[de-identified] : Physical exam of her left wrist: There is minimal swelling. Negative ecchymosis. The wound is clean and dry. No signs of drainage, pus, or infection.  Good range of motion of the fingers. She can almost make a full fist. Sensory and motor are intact.

## 2024-04-08 NOTE — DISCUSSION/SUMMARY
[de-identified] : Patient already had a custom splint made by OT, but since the swelling has gone down it does not fit her well.  She is going to get it adjusted when she goes to her next appointment.  She will continue wearing the splint for protection and follow-up with Dr. Jarquin in 3 to 4 weeks for repeat x-ray and evaluation.  I refilled her Percocet prescription at her request.  She will continue to alternate the ibuprofen with the pain medication.  All questions were answered today.

## 2024-04-08 NOTE — DATA REVIEWED
[FreeTextEntry1] : 3 x-ray views repeated in the office today of her left hand show all postsurgical hardware intact.

## 2024-04-08 NOTE — HISTORY OF PRESENT ILLNESS
[de-identified] : Patient is a 33 year F here for her first PO appt. She is status post a left hand done by Dr. Jarquin. She is doing well.

## 2024-04-29 ENCOUNTER — APPOINTMENT (OUTPATIENT)
Dept: ORTHOPEDIC SURGERY | Facility: CLINIC | Age: 34
End: 2024-04-29

## 2024-08-21 NOTE — ED PROVIDER NOTE - IV ALTEPLASE INCLUSION HIDDEN
show

## 2024-09-06 ENCOUNTER — EMERGENCY (EMERGENCY)
Facility: HOSPITAL | Age: 34
LOS: 0 days | Discharge: HOME | End: 2024-09-06
Attending: EMERGENCY MEDICINE
Payer: MEDICAID

## 2024-09-06 VITALS
WEIGHT: 255.07 LBS | HEART RATE: 66 BPM | OXYGEN SATURATION: 100 % | TEMPERATURE: 99 F | DIASTOLIC BLOOD PRESSURE: 91 MMHG | SYSTOLIC BLOOD PRESSURE: 134 MMHG | RESPIRATION RATE: 16 BRPM

## 2024-09-06 DIAGNOSIS — Z90.49 ACQUIRED ABSENCE OF OTHER SPECIFIED PARTS OF DIGESTIVE TRACT: Chronic | ICD-10-CM

## 2024-09-06 DIAGNOSIS — R07.89 OTHER CHEST PAIN: ICD-10-CM

## 2024-09-06 DIAGNOSIS — F41.0 PANIC DISORDER [EPISODIC PAROXYSMAL ANXIETY]: ICD-10-CM

## 2024-09-06 DIAGNOSIS — R20.0 ANESTHESIA OF SKIN: ICD-10-CM

## 2024-09-06 DIAGNOSIS — R06.02 SHORTNESS OF BREATH: ICD-10-CM

## 2024-09-06 DIAGNOSIS — R20.2 PARESTHESIA OF SKIN: ICD-10-CM

## 2024-09-06 LAB
ALBUMIN SERPL ELPH-MCNC: 4.2 G/DL — SIGNIFICANT CHANGE UP (ref 3.5–5.2)
ALP SERPL-CCNC: 49 U/L — SIGNIFICANT CHANGE UP (ref 30–115)
ALT FLD-CCNC: 14 U/L — SIGNIFICANT CHANGE UP (ref 0–41)
ANION GAP SERPL CALC-SCNC: 12 MMOL/L — SIGNIFICANT CHANGE UP (ref 7–14)
AST SERPL-CCNC: 26 U/L — SIGNIFICANT CHANGE UP (ref 0–41)
BASOPHILS # BLD AUTO: 0.05 K/UL — SIGNIFICANT CHANGE UP (ref 0–0.2)
BASOPHILS NFR BLD AUTO: 0.9 % — SIGNIFICANT CHANGE UP (ref 0–1)
BILIRUB SERPL-MCNC: <0.2 MG/DL — SIGNIFICANT CHANGE UP (ref 0.2–1.2)
BUN SERPL-MCNC: 12 MG/DL — SIGNIFICANT CHANGE UP (ref 10–20)
CALCIUM SERPL-MCNC: 9.2 MG/DL — SIGNIFICANT CHANGE UP (ref 8.4–10.4)
CHLORIDE SERPL-SCNC: 107 MMOL/L — SIGNIFICANT CHANGE UP (ref 98–110)
CO2 SERPL-SCNC: 25 MMOL/L — SIGNIFICANT CHANGE UP (ref 17–32)
CREAT SERPL-MCNC: 0.9 MG/DL — SIGNIFICANT CHANGE UP (ref 0.7–1.5)
D DIMER BLD IA.RAPID-MCNC: 174 NG/ML DDU — SIGNIFICANT CHANGE UP
EGFR: 86 ML/MIN/1.73M2 — SIGNIFICANT CHANGE UP
EOSINOPHIL # BLD AUTO: 0.21 K/UL — SIGNIFICANT CHANGE UP (ref 0–0.7)
EOSINOPHIL NFR BLD AUTO: 3.9 % — SIGNIFICANT CHANGE UP (ref 0–8)
GLUCOSE SERPL-MCNC: 92 MG/DL — SIGNIFICANT CHANGE UP (ref 70–99)
HCG SERPL QL: NEGATIVE — SIGNIFICANT CHANGE UP
HCT VFR BLD CALC: 36.3 % — LOW (ref 37–47)
HGB BLD-MCNC: 11.5 G/DL — LOW (ref 12–16)
IMM GRANULOCYTES NFR BLD AUTO: 0.2 % — SIGNIFICANT CHANGE UP (ref 0.1–0.3)
LYMPHOCYTES # BLD AUTO: 1.72 K/UL — SIGNIFICANT CHANGE UP (ref 1.2–3.4)
LYMPHOCYTES # BLD AUTO: 31.6 % — SIGNIFICANT CHANGE UP (ref 20.5–51.1)
MAGNESIUM SERPL-MCNC: 1.8 MG/DL — SIGNIFICANT CHANGE UP (ref 1.8–2.4)
MCHC RBC-ENTMCNC: 27.8 PG — SIGNIFICANT CHANGE UP (ref 27–31)
MCHC RBC-ENTMCNC: 31.7 G/DL — LOW (ref 32–37)
MCV RBC AUTO: 87.9 FL — SIGNIFICANT CHANGE UP (ref 81–99)
MONOCYTES # BLD AUTO: 0.65 K/UL — HIGH (ref 0.1–0.6)
MONOCYTES NFR BLD AUTO: 11.9 % — HIGH (ref 1.7–9.3)
NEUTROPHILS # BLD AUTO: 2.8 K/UL — SIGNIFICANT CHANGE UP (ref 1.4–6.5)
NEUTROPHILS NFR BLD AUTO: 51.5 % — SIGNIFICANT CHANGE UP (ref 42.2–75.2)
NRBC # BLD: 0 /100 WBCS — SIGNIFICANT CHANGE UP (ref 0–0)
PLATELET # BLD AUTO: 283 K/UL — SIGNIFICANT CHANGE UP (ref 130–400)
PMV BLD: 10.7 FL — HIGH (ref 7.4–10.4)
POTASSIUM SERPL-MCNC: 5.1 MMOL/L — HIGH (ref 3.5–5)
POTASSIUM SERPL-SCNC: 5.1 MMOL/L — HIGH (ref 3.5–5)
PROT SERPL-MCNC: 7.3 G/DL — SIGNIFICANT CHANGE UP (ref 6–8)
RBC # BLD: 4.13 M/UL — LOW (ref 4.2–5.4)
RBC # FLD: 14.3 % — SIGNIFICANT CHANGE UP (ref 11.5–14.5)
SODIUM SERPL-SCNC: 144 MMOL/L — SIGNIFICANT CHANGE UP (ref 135–146)
TROPONIN T, HIGH SENSITIVITY RESULT: <6 NG/L — SIGNIFICANT CHANGE UP (ref 6–13)
WBC # BLD: 5.44 K/UL — SIGNIFICANT CHANGE UP (ref 4.8–10.8)
WBC # FLD AUTO: 5.44 K/UL — SIGNIFICANT CHANGE UP (ref 4.8–10.8)

## 2024-09-06 PROCEDURE — 96374 THER/PROPH/DIAG INJ IV PUSH: CPT

## 2024-09-06 PROCEDURE — 83735 ASSAY OF MAGNESIUM: CPT

## 2024-09-06 PROCEDURE — 71046 X-RAY EXAM CHEST 2 VIEWS: CPT | Mod: 26

## 2024-09-06 PROCEDURE — 71045 X-RAY EXAM CHEST 1 VIEW: CPT | Mod: 26,59

## 2024-09-06 PROCEDURE — 84484 ASSAY OF TROPONIN QUANT: CPT

## 2024-09-06 PROCEDURE — 84703 CHORIONIC GONADOTROPIN ASSAY: CPT

## 2024-09-06 PROCEDURE — 80053 COMPREHEN METABOLIC PANEL: CPT

## 2024-09-06 PROCEDURE — 36415 COLL VENOUS BLD VENIPUNCTURE: CPT

## 2024-09-06 PROCEDURE — 93010 ELECTROCARDIOGRAM REPORT: CPT

## 2024-09-06 PROCEDURE — 99285 EMERGENCY DEPT VISIT HI MDM: CPT | Mod: 25

## 2024-09-06 PROCEDURE — 85379 FIBRIN DEGRADATION QUANT: CPT

## 2024-09-06 PROCEDURE — 71046 X-RAY EXAM CHEST 2 VIEWS: CPT

## 2024-09-06 PROCEDURE — 71045 X-RAY EXAM CHEST 1 VIEW: CPT

## 2024-09-06 PROCEDURE — 93005 ELECTROCARDIOGRAM TRACING: CPT

## 2024-09-06 PROCEDURE — 85025 COMPLETE CBC W/AUTO DIFF WBC: CPT

## 2024-09-06 PROCEDURE — 99285 EMERGENCY DEPT VISIT HI MDM: CPT

## 2024-09-06 RX ORDER — KETOROLAC TROMETHAMINE 30 MG/ML
15 INJECTION, SOLUTION INTRAMUSCULAR ONCE
Refills: 0 | Status: DISCONTINUED | OUTPATIENT
Start: 2024-09-06 | End: 2024-09-06

## 2024-09-06 RX ADMIN — KETOROLAC TROMETHAMINE 15 MILLIGRAM(S): 30 INJECTION, SOLUTION INTRAMUSCULAR at 21:05

## 2024-09-06 NOTE — ED PROVIDER NOTE - CLINICAL SUMMARY MEDICAL DECISION MAKING FREE TEXT BOX
34-year-old female with history of anxiety, in ER with c/o CP.  Patient states since yesterday she has been having intermittent episodes of sharp pain across her chest.  Lasts a few minutes and then improves.  Associated with some SOB.  Nonexertional.  No cough.  No F/C.  No LE pain/swelling.  No recent travel.  Not any OCPs.  No abdominal pain.  No HA/dizziness/syncope.  Also complaining of tingling sensation to bilateral upper extremities.  No motor weakness.  No back pain.  PE - nad, nc/at, eomi, perrl, op - clear, mmm, neck supple, cta b/l, no w/r/r, rrr, abd- soft, nt/nd, nabs, from x 4, no LE swelling/tenderness, distal pulses 2+ b/l,  A&O x 3, cn 2-12 intact, motor 5/5 b/l UE and LE, sensation intact x 4, + steady gait.  -Labs reviewed: CBC with mild anemia, otherwise normal, CMP unremarkable.  HST negative, D-dimer negative.  Mag 1.8.  EKG NSR no acute ischemic changes.  CXR negative.  Results of labs and imaging discussed with patient.  Heart score 0.  To DC home, patient to follow-up with cardiology/PMD as outpatient.  Told to return to ER if she feels worse, or for any new/concerning symptoms.  Patient understands and agrees with plan.

## 2024-09-06 NOTE — ED PROVIDER NOTE - OBJECTIVE STATEMENT
Patient is a 34-year-old female no PMH who presents to the ED with complaints of chest tightness that began today associated with numbness and tingling in the bilateral arms.  Patient reports she has had  anxiety attacks in the past that of similar symptoms but never to this extent.  Has not had cardiac workup in the past.  Denies fever, chills, cough, SOB, dizziness, slurred speech, unsteady gait, N/V/D, abdominal pain, or urinary symptoms.

## 2024-09-06 NOTE — ED ADULT NURSE NOTE - OBJECTIVE STATEMENT
Patient states shes been having on/off chest pains for 2 hours. Patient endorses that she has never felt that pain before. Denies fevers, chills, N/V/D.
0

## 2024-09-06 NOTE — ED PROVIDER NOTE - PATIENT PORTAL LINK FT
You can access the FollowMyHealth Patient Portal offered by [facility] by registering at the following website: [email or web address]. By joining [employer]’s FollowMyHealth portal, you will also be able to view your health information using other applications (apps) compatible with our system.

## 2024-09-06 NOTE — ED PROVIDER NOTE - NSFOLLOWUPINSTRUCTIONS_ED_ALL_ED_FT
Our Emergency Department Referral Coordinators will be reaching out to you in the next 24-48 hours from 9:00am to 5:00pm with a follow up appointment. Please expect a phone call from the hospital in that time frame. If you do not receive a call or if you have any questions or concerns, you can reach them at   [phone].     Chest Pain    Chest pain can be caused by many different conditions which may or may not be dangerous. Causes include heartburn, lung infections, heart attack, blood clot in lungs, skin infections, strain or damage to muscle, cartilage, or bones, etc. Lab tests or other studies including an electrocardiogram (EKG) may have been performed to find the cause of your pain. Make sure to follow up with a cardiologist or as instructed by your health care professional.    SEEK IMMEDIATE MEDICAL CARE IF YOU HAVE THE FOLLOWING SYMPTOMS: worsening chest pain, coughing up blood, unexplained back/neck/jaw pain, severe abdominal pain, dizziness or lightheadedness, shortness of breath, sweaty or clammy skin, vomiting, or racing heart beat. These symptoms may represent a serious problem that is an emergency. Do not wait to see if the symptoms will go away. Get medical help right away. Call your local emergency services (911 in the [place] Do not drive yourself to the hospital.

## 2024-09-06 NOTE — ED PROVIDER NOTE - PHYSICAL EXAMINATION
VITAL SIGNS: I have reviewed nursing notes and confirm.  CONSTITUTIONAL: In no acute distress.  SKIN: Skin exam is warm and dry.  HEAD: Normocephalic; atraumatic.  EYES: PERRL, EOM intact; conjunctiva and sclera clear.  ENT: No nasal discharge; airway clear.  NECK: Supple; non tender.  CARD: S1, S2; Regular rate and rhythm.  RESP: CTAB. Speaking in full sentences.   ABD: Normal bowel sounds; soft; non-distended; non-tender  EXT: Normal ROM. No LE edema.   NEURO: Alert, oriented. Grossly unremarkable. No focal deficits. Strength and sensation equal throughout. (-) pronator drift. No gait abnormalities.

## 2024-09-06 NOTE — ED PROVIDER NOTE - PROGRESS NOTE DETAILS
CR: All findings discussed with patient.  Will give rapid referral to cardiology for formal workup.  Patient reports improvement in symptoms.  Will DC with return precautions.

## 2024-09-30 ENCOUNTER — EMERGENCY (EMERGENCY)
Facility: HOSPITAL | Age: 34
LOS: 0 days | Discharge: HOME | End: 2024-09-30
Attending: EMERGENCY MEDICINE
Payer: MEDICAID

## 2024-09-30 VITALS
SYSTOLIC BLOOD PRESSURE: 123 MMHG | HEIGHT: 67 IN | DIASTOLIC BLOOD PRESSURE: 71 MMHG | HEART RATE: 78 BPM | TEMPERATURE: 98 F | OXYGEN SATURATION: 99 % | WEIGHT: 250 LBS | RESPIRATION RATE: 16 BRPM

## 2024-09-30 DIAGNOSIS — R10.30 LOWER ABDOMINAL PAIN, UNSPECIFIED: ICD-10-CM

## 2024-09-30 DIAGNOSIS — R11.0 NAUSEA: ICD-10-CM

## 2024-09-30 DIAGNOSIS — Z90.49 ACQUIRED ABSENCE OF OTHER SPECIFIED PARTS OF DIGESTIVE TRACT: Chronic | ICD-10-CM

## 2024-09-30 DIAGNOSIS — R19.7 DIARRHEA, UNSPECIFIED: ICD-10-CM

## 2024-09-30 LAB
ALBUMIN SERPL ELPH-MCNC: 4.1 G/DL — SIGNIFICANT CHANGE UP (ref 3.5–5.2)
ALP SERPL-CCNC: 58 U/L — SIGNIFICANT CHANGE UP (ref 30–115)
ALT FLD-CCNC: 9 U/L — SIGNIFICANT CHANGE UP (ref 0–41)
ANION GAP SERPL CALC-SCNC: 11 MMOL/L — SIGNIFICANT CHANGE UP (ref 7–14)
APPEARANCE UR: CLEAR — SIGNIFICANT CHANGE UP
AST SERPL-CCNC: 11 U/L — SIGNIFICANT CHANGE UP (ref 0–41)
BACTERIA # UR AUTO: NEGATIVE /HPF — SIGNIFICANT CHANGE UP
BASOPHILS # BLD AUTO: 0.03 K/UL — SIGNIFICANT CHANGE UP (ref 0–0.2)
BASOPHILS NFR BLD AUTO: 0.9 % — SIGNIFICANT CHANGE UP (ref 0–1)
BILIRUB DIRECT SERPL-MCNC: <0.2 MG/DL — SIGNIFICANT CHANGE UP (ref 0–0.3)
BILIRUB INDIRECT FLD-MCNC: SIGNIFICANT CHANGE UP MG/DL (ref 0.2–1.2)
BILIRUB SERPL-MCNC: <0.2 MG/DL — SIGNIFICANT CHANGE UP (ref 0.2–1.2)
BILIRUB UR-MCNC: NEGATIVE — SIGNIFICANT CHANGE UP
BUN SERPL-MCNC: 13 MG/DL — SIGNIFICANT CHANGE UP (ref 10–20)
CALCIUM SERPL-MCNC: 8.6 MG/DL — SIGNIFICANT CHANGE UP (ref 8.4–10.5)
CAST: 0 /LPF — SIGNIFICANT CHANGE UP (ref 0–4)
CHLORIDE SERPL-SCNC: 107 MMOL/L — SIGNIFICANT CHANGE UP (ref 98–110)
CO2 SERPL-SCNC: 23 MMOL/L — SIGNIFICANT CHANGE UP (ref 17–32)
COLOR SPEC: YELLOW — SIGNIFICANT CHANGE UP
CREAT SERPL-MCNC: 0.7 MG/DL — SIGNIFICANT CHANGE UP (ref 0.7–1.5)
DIFF PNL FLD: ABNORMAL
EGFR: 116 ML/MIN/1.73M2 — SIGNIFICANT CHANGE UP
EOSINOPHIL # BLD AUTO: 0.26 K/UL — SIGNIFICANT CHANGE UP (ref 0–0.7)
EOSINOPHIL NFR BLD AUTO: 7.4 % — SIGNIFICANT CHANGE UP (ref 0–8)
GLUCOSE SERPL-MCNC: 90 MG/DL — SIGNIFICANT CHANGE UP (ref 70–99)
GLUCOSE UR QL: NEGATIVE MG/DL — SIGNIFICANT CHANGE UP
HCG SERPL QL: NEGATIVE — SIGNIFICANT CHANGE UP
HCT VFR BLD CALC: 35.5 % — LOW (ref 37–47)
HGB BLD-MCNC: 11.5 G/DL — LOW (ref 12–16)
IMM GRANULOCYTES NFR BLD AUTO: 0.3 % — SIGNIFICANT CHANGE UP (ref 0.1–0.3)
KETONES UR-MCNC: NEGATIVE MG/DL — SIGNIFICANT CHANGE UP
LACTATE SERPL-SCNC: 0.8 MMOL/L — SIGNIFICANT CHANGE UP (ref 0.7–2)
LEUKOCYTE ESTERASE UR-ACNC: NEGATIVE — SIGNIFICANT CHANGE UP
LIDOCAIN IGE QN: 19 U/L — SIGNIFICANT CHANGE UP (ref 7–60)
LYMPHOCYTES # BLD AUTO: 1.08 K/UL — LOW (ref 1.2–3.4)
LYMPHOCYTES # BLD AUTO: 30.9 % — SIGNIFICANT CHANGE UP (ref 20.5–51.1)
MCHC RBC-ENTMCNC: 28.5 PG — SIGNIFICANT CHANGE UP (ref 27–31)
MCHC RBC-ENTMCNC: 32.4 G/DL — SIGNIFICANT CHANGE UP (ref 32–37)
MCV RBC AUTO: 88.1 FL — SIGNIFICANT CHANGE UP (ref 81–99)
MONOCYTES # BLD AUTO: 0.36 K/UL — SIGNIFICANT CHANGE UP (ref 0.1–0.6)
MONOCYTES NFR BLD AUTO: 10.3 % — HIGH (ref 1.7–9.3)
NEUTROPHILS # BLD AUTO: 1.76 K/UL — SIGNIFICANT CHANGE UP (ref 1.4–6.5)
NEUTROPHILS NFR BLD AUTO: 50.2 % — SIGNIFICANT CHANGE UP (ref 42.2–75.2)
NITRITE UR-MCNC: NEGATIVE — SIGNIFICANT CHANGE UP
NRBC # BLD: 0 /100 WBCS — SIGNIFICANT CHANGE UP (ref 0–0)
PH UR: 6.5 — SIGNIFICANT CHANGE UP (ref 5–8)
PLATELET # BLD AUTO: 277 K/UL — SIGNIFICANT CHANGE UP (ref 130–400)
PMV BLD: 10.5 FL — HIGH (ref 7.4–10.4)
POTASSIUM SERPL-MCNC: 4.5 MMOL/L — SIGNIFICANT CHANGE UP (ref 3.5–5)
POTASSIUM SERPL-SCNC: 4.5 MMOL/L — SIGNIFICANT CHANGE UP (ref 3.5–5)
PROT SERPL-MCNC: 6.6 G/DL — SIGNIFICANT CHANGE UP (ref 6–8)
PROT UR-MCNC: NEGATIVE MG/DL — SIGNIFICANT CHANGE UP
RBC # BLD: 4.03 M/UL — LOW (ref 4.2–5.4)
RBC # FLD: 14.8 % — HIGH (ref 11.5–14.5)
RBC CASTS # UR COMP ASSIST: 51 /HPF — HIGH (ref 0–4)
SODIUM SERPL-SCNC: 141 MMOL/L — SIGNIFICANT CHANGE UP (ref 135–146)
SP GR SPEC: 1.01 — SIGNIFICANT CHANGE UP (ref 1–1.03)
SQUAMOUS # UR AUTO: 4 /HPF — SIGNIFICANT CHANGE UP (ref 0–5)
UROBILINOGEN FLD QL: 1 MG/DL — SIGNIFICANT CHANGE UP (ref 0.2–1)
WBC # BLD: 3.5 K/UL — LOW (ref 4.8–10.8)
WBC # FLD AUTO: 3.5 K/UL — LOW (ref 4.8–10.8)
WBC UR QL: 1 /HPF — SIGNIFICANT CHANGE UP (ref 0–5)

## 2024-09-30 PROCEDURE — 84703 CHORIONIC GONADOTROPIN ASSAY: CPT

## 2024-09-30 PROCEDURE — 96375 TX/PRO/DX INJ NEW DRUG ADDON: CPT

## 2024-09-30 PROCEDURE — 96374 THER/PROPH/DIAG INJ IV PUSH: CPT | Mod: XU

## 2024-09-30 PROCEDURE — 83605 ASSAY OF LACTIC ACID: CPT

## 2024-09-30 PROCEDURE — 80048 BASIC METABOLIC PNL TOTAL CA: CPT

## 2024-09-30 PROCEDURE — 74177 CT ABD & PELVIS W/CONTRAST: CPT | Mod: 26,MC

## 2024-09-30 PROCEDURE — 85025 COMPLETE CBC W/AUTO DIFF WBC: CPT

## 2024-09-30 PROCEDURE — 81001 URINALYSIS AUTO W/SCOPE: CPT

## 2024-09-30 PROCEDURE — 80076 HEPATIC FUNCTION PANEL: CPT

## 2024-09-30 PROCEDURE — 74177 CT ABD & PELVIS W/CONTRAST: CPT | Mod: MC

## 2024-09-30 PROCEDURE — 83690 ASSAY OF LIPASE: CPT

## 2024-09-30 PROCEDURE — 99284 EMERGENCY DEPT VISIT MOD MDM: CPT | Mod: 25

## 2024-09-30 PROCEDURE — 99285 EMERGENCY DEPT VISIT HI MDM: CPT

## 2024-09-30 PROCEDURE — 36415 COLL VENOUS BLD VENIPUNCTURE: CPT

## 2024-09-30 RX ORDER — FAMOTIDINE 10 MG/ML
20 INJECTION INTRAVENOUS ONCE
Refills: 0 | Status: COMPLETED | OUTPATIENT
Start: 2024-09-30 | End: 2024-09-30

## 2024-09-30 RX ORDER — ONDANSETRON 2 MG/ML
4 INJECTION, SOLUTION INTRAMUSCULAR; INTRAVENOUS ONCE
Refills: 0 | Status: COMPLETED | OUTPATIENT
Start: 2024-09-30 | End: 2024-09-30

## 2024-09-30 RX ORDER — SODIUM CHLORIDE 9 MG/ML
1000 INJECTION INTRAMUSCULAR; INTRAVENOUS; SUBCUTANEOUS ONCE
Refills: 0 | Status: COMPLETED | OUTPATIENT
Start: 2024-09-30 | End: 2024-09-30

## 2024-09-30 RX ADMIN — ONDANSETRON 4 MILLIGRAM(S): 2 INJECTION, SOLUTION INTRAMUSCULAR; INTRAVENOUS at 09:23

## 2024-09-30 RX ADMIN — SODIUM CHLORIDE 1000 MILLILITER(S): 9 INJECTION INTRAMUSCULAR; INTRAVENOUS; SUBCUTANEOUS at 09:23

## 2024-09-30 RX ADMIN — FAMOTIDINE 20 MILLIGRAM(S): 10 INJECTION INTRAVENOUS at 09:23

## 2024-09-30 NOTE — ED PROVIDER NOTE - NSFOLLOWUPINSTRUCTIONS_ED_ALL_ED_FT
Our Emergency Department Referral Coordinators will be reaching out to you in the next 24-48 hours from 9:00am to 5:00pm with a follow up appointment. Please expect a phone call from the hospital in that time frame. If you do not receive a call or if you have any questions or concerns, you can reach them at [phone].    Abdominal Pain    Many things can cause abdominal pain. Many times, abdominal pain is not caused by a disease and will improve without treatment. Your health care provider will do a physical exam to determine if there is a dangerous cause of your pain; blood tests and imaging may help determine the cause of your pain. However, in many cases, no cause may be found and you may need further testing as an outpatient. Monitor your abdominal pain for any changes.     SEEK IMMEDIATE MEDICAL CARE IF YOU HAVE ANY OF THE FOLLOWING SYMPTOMS: worsening abdominal pain, uncontrollable vomiting, profuse diarrhea, inability to have bowel movements or pass gas, black or bloody stools, fever accompanying chest pain or back pain, or fainting. These symptoms may represent a serious problem that is an emergency. Do not wait to see if the symptoms will go away. Get medical help right away. Call 911 and do not drive yourself to the hospital.

## 2024-09-30 NOTE — ED PROVIDER NOTE - OBJECTIVE STATEMENT
34-year-old female with history of anxiety presents to the ED complaining of diffuse abdominal pain described as burning with nausea and diarrhea.  Patient denies any vomiting, fever, chills, urinary symptoms, chest pain or shortness of breath.

## 2024-09-30 NOTE — ED PROVIDER NOTE - PATIENT PORTAL LINK FT
You can access the FollowMyHealth Patient Portal offered by [facility] by registering at the following website: [email or web address]. By joining [employer]’s FollowMyHealth portal, you will also be able to view your health information using other applications (apps) compatible with our system.

## 2024-09-30 NOTE — ED PROVIDER NOTE - ATTENDING APP SHARED VISIT CONTRIBUTION OF CARE
34-year-old female, past medical history of anxiety, comes in complaining of diffuse abdominal burning, associated with nausea and diarrhea since Friday.  No fever/chills, vomiting, chest pain/shortness of breath, urinary symptoms, back pain, vaginal discharge.  Patient is currently on her period right now.  Patient states food makes her symptoms worse.  On exam, pt in NAD, AAOx3, head NC/AT, CN II-XII intact, lungs CTA B/L, CV S1S2 regular, abdomen soft/(+) generalized abdominal discomfort/ND/(+)BS, ext (-) edema, motor 5/5x4, sensation intact.  Will do labs, CT, UA, and reevaluate.  Pepcid/Zofran given.

## 2024-09-30 NOTE — ED PROVIDER NOTE - CLINICAL SUMMARY MEDICAL DECISION MAKING FREE TEXT BOX
34-year-old female, past medical history of anxiety, comes in complaining of diffuse abdominal burning, associated with nausea and diarrhea since Friday.  No fever/chills, vomiting, chest pain/shortness of breath, urinary symptoms, back pain, vaginal discharge.  Patient is currently on her period right now.  Patient states food makes her symptoms worse.  On exam, pt in NAD, AAOx3, head NC/AT, CN II-XII intact, lungs CTA B/L, CV S1S2 regular, abdomen soft/(+) generalized abdominal discomfort/ND/(+)BS, ext (-) edema, motor 5/5x4, sensation intact.  Work up reviewed. Pt feels better after meds. Tolerating PO. WIll d/c.

## 2024-09-30 NOTE — ED PROVIDER NOTE - PHYSICAL EXAMINATION
Vital Signs: I have reviewed the initial vital signs.  Constitutional: NAD, well-nourished, appears stated age, no acute distress.  HEENT: Airway patent, moist MM, no erythema/swelling/deformity of oral structures. EOMI, PERRLA.  CV: regular rate, regular rhythm, well-perfused extremities, 2+ b/l DP and radial pulses equal.  Lungs: BCTA, no increased WOB.  ABD: +tenderness diffuse lower abdomen, ND, no guarding or rebound, no pulsatile mass, no hernias.   MSK: Neck supple, nontender, nl ROM, no stepoff. Chest nontender. Back nontender. Ext nontender, nl rom, no deformity.   INTEG: Skin warm, dry, no rash.  NEURO: A&Ox3, normal strength, nl sensation throughout, normal speech.   PSYCH: Calm, cooperative, normal affect and interaction.

## 2025-08-16 ENCOUNTER — EMERGENCY (EMERGENCY)
Facility: HOSPITAL | Age: 35
LOS: 0 days | Discharge: HOME | End: 2025-08-16
Attending: STUDENT IN AN ORGANIZED HEALTH CARE EDUCATION/TRAINING PROGRAM
Payer: MEDICAID

## 2025-08-16 VITALS
OXYGEN SATURATION: 96 % | HEIGHT: 67 IN | RESPIRATION RATE: 18 BRPM | DIASTOLIC BLOOD PRESSURE: 87 MMHG | HEART RATE: 72 BPM | WEIGHT: 259.93 LBS | TEMPERATURE: 99 F | SYSTOLIC BLOOD PRESSURE: 140 MMHG

## 2025-08-16 DIAGNOSIS — M25.512 PAIN IN LEFT SHOULDER: ICD-10-CM

## 2025-08-16 DIAGNOSIS — Z90.49 ACQUIRED ABSENCE OF OTHER SPECIFIED PARTS OF DIGESTIVE TRACT: Chronic | ICD-10-CM

## 2025-08-16 DIAGNOSIS — M79.662 PAIN IN LEFT LOWER LEG: ICD-10-CM

## 2025-08-16 DIAGNOSIS — R07.89 OTHER CHEST PAIN: ICD-10-CM

## 2025-08-16 LAB
ALBUMIN SERPL ELPH-MCNC: 4.2 G/DL — SIGNIFICANT CHANGE UP (ref 3.5–5.2)
ALP SERPL-CCNC: 56 U/L — SIGNIFICANT CHANGE UP (ref 30–115)
ALT FLD-CCNC: 11 U/L — SIGNIFICANT CHANGE UP (ref 0–41)
ANION GAP SERPL CALC-SCNC: 13 MMOL/L — SIGNIFICANT CHANGE UP (ref 7–14)
APTT BLD: 34.6 SEC — SIGNIFICANT CHANGE UP (ref 27–39.2)
AST SERPL-CCNC: 16 U/L — SIGNIFICANT CHANGE UP (ref 0–41)
BASOPHILS # BLD AUTO: 0.05 K/UL — SIGNIFICANT CHANGE UP (ref 0–0.2)
BASOPHILS NFR BLD AUTO: 0.8 % — SIGNIFICANT CHANGE UP (ref 0–1)
BILIRUB SERPL-MCNC: 0.7 MG/DL — SIGNIFICANT CHANGE UP (ref 0.2–1.2)
BUN SERPL-MCNC: 8 MG/DL — LOW (ref 10–20)
CALCIUM SERPL-MCNC: 9 MG/DL — SIGNIFICANT CHANGE UP (ref 8.4–10.5)
CHLORIDE SERPL-SCNC: 106 MMOL/L — SIGNIFICANT CHANGE UP (ref 98–110)
CO2 SERPL-SCNC: 20 MMOL/L — SIGNIFICANT CHANGE UP (ref 17–32)
CREAT SERPL-MCNC: 0.7 MG/DL — SIGNIFICANT CHANGE UP (ref 0.7–1.5)
D DIMER BLD IA.RAPID-MCNC: <150 NG/ML DDU — SIGNIFICANT CHANGE UP
EGFR: 116 ML/MIN/1.73M2 — SIGNIFICANT CHANGE UP
EGFR: 116 ML/MIN/1.73M2 — SIGNIFICANT CHANGE UP
EOSINOPHIL # BLD AUTO: 0.38 K/UL — SIGNIFICANT CHANGE UP (ref 0–0.7)
EOSINOPHIL NFR BLD AUTO: 6.1 % — SIGNIFICANT CHANGE UP (ref 0–8)
GLUCOSE SERPL-MCNC: 82 MG/DL — SIGNIFICANT CHANGE UP (ref 70–99)
HCG SERPL QL: NEGATIVE — SIGNIFICANT CHANGE UP
HCT VFR BLD CALC: 35 % — LOW (ref 37–47)
HGB BLD-MCNC: 11.7 G/DL — LOW (ref 12–16)
IMM GRANULOCYTES NFR BLD AUTO: 0.2 % — SIGNIFICANT CHANGE UP (ref 0.1–0.3)
INR BLD: 1.03 RATIO — SIGNIFICANT CHANGE UP (ref 0.65–1.3)
LYMPHOCYTES # BLD AUTO: 1.75 K/UL — SIGNIFICANT CHANGE UP (ref 1.2–3.4)
LYMPHOCYTES # BLD AUTO: 28.2 % — SIGNIFICANT CHANGE UP (ref 20.5–51.1)
MCHC RBC-ENTMCNC: 28.8 PG — SIGNIFICANT CHANGE UP (ref 27–31)
MCHC RBC-ENTMCNC: 33.4 G/DL — SIGNIFICANT CHANGE UP (ref 32–37)
MCV RBC AUTO: 86.2 FL — SIGNIFICANT CHANGE UP (ref 81–99)
MONOCYTES # BLD AUTO: 0.63 K/UL — HIGH (ref 0.1–0.6)
MONOCYTES NFR BLD AUTO: 10.1 % — HIGH (ref 1.7–9.3)
NEUTROPHILS # BLD AUTO: 3.39 K/UL — SIGNIFICANT CHANGE UP (ref 1.4–6.5)
NEUTROPHILS NFR BLD AUTO: 54.6 % — SIGNIFICANT CHANGE UP (ref 42.2–75.2)
NRBC BLD AUTO-RTO: 0 /100 WBCS — SIGNIFICANT CHANGE UP (ref 0–0)
NT-PROBNP SERPL-SCNC: <36 PG/ML — SIGNIFICANT CHANGE UP (ref 0–300)
PLATELET # BLD AUTO: 236 K/UL — SIGNIFICANT CHANGE UP (ref 130–400)
PMV BLD: 10.8 FL — HIGH (ref 7.4–10.4)
POTASSIUM SERPL-MCNC: 3.9 MMOL/L — SIGNIFICANT CHANGE UP (ref 3.5–5)
POTASSIUM SERPL-SCNC: 3.9 MMOL/L — SIGNIFICANT CHANGE UP (ref 3.5–5)
PROT SERPL-MCNC: 6.6 G/DL — SIGNIFICANT CHANGE UP (ref 6–8)
PROTHROM AB SERPL-ACNC: 12.2 SEC — SIGNIFICANT CHANGE UP (ref 9.95–12.87)
RBC # BLD: 4.06 M/UL — LOW (ref 4.2–5.4)
RBC # FLD: 15 % — HIGH (ref 11.5–14.5)
SODIUM SERPL-SCNC: 139 MMOL/L — SIGNIFICANT CHANGE UP (ref 135–146)
TROPONIN T, HIGH SENSITIVITY RESULT: 7 NG/L — SIGNIFICANT CHANGE UP (ref 6–13)
WBC # BLD: 6.21 K/UL — SIGNIFICANT CHANGE UP (ref 4.8–10.8)
WBC # FLD AUTO: 6.21 K/UL — SIGNIFICANT CHANGE UP (ref 4.8–10.8)

## 2025-08-16 PROCEDURE — 93010 ELECTROCARDIOGRAM REPORT: CPT

## 2025-08-16 PROCEDURE — 84484 ASSAY OF TROPONIN QUANT: CPT

## 2025-08-16 PROCEDURE — 71045 X-RAY EXAM CHEST 1 VIEW: CPT

## 2025-08-16 PROCEDURE — 85730 THROMBOPLASTIN TIME PARTIAL: CPT

## 2025-08-16 PROCEDURE — 83880 ASSAY OF NATRIURETIC PEPTIDE: CPT

## 2025-08-16 PROCEDURE — 99285 EMERGENCY DEPT VISIT HI MDM: CPT | Mod: 25

## 2025-08-16 PROCEDURE — 96374 THER/PROPH/DIAG INJ IV PUSH: CPT

## 2025-08-16 PROCEDURE — 85025 COMPLETE CBC W/AUTO DIFF WBC: CPT

## 2025-08-16 PROCEDURE — 36415 COLL VENOUS BLD VENIPUNCTURE: CPT

## 2025-08-16 PROCEDURE — 93970 EXTREMITY STUDY: CPT | Mod: 26

## 2025-08-16 PROCEDURE — 84703 CHORIONIC GONADOTROPIN ASSAY: CPT

## 2025-08-16 PROCEDURE — 80053 COMPREHEN METABOLIC PANEL: CPT

## 2025-08-16 PROCEDURE — 93970 EXTREMITY STUDY: CPT

## 2025-08-16 PROCEDURE — 71045 X-RAY EXAM CHEST 1 VIEW: CPT | Mod: 26

## 2025-08-16 PROCEDURE — 99285 EMERGENCY DEPT VISIT HI MDM: CPT

## 2025-08-16 PROCEDURE — 85610 PROTHROMBIN TIME: CPT

## 2025-08-16 PROCEDURE — 85379 FIBRIN DEGRADATION QUANT: CPT

## 2025-08-16 PROCEDURE — 93005 ELECTROCARDIOGRAM TRACING: CPT

## 2025-08-16 RX ORDER — KETOROLAC TROMETHAMINE 30 MG/ML
15 INJECTION, SOLUTION INTRAMUSCULAR; INTRAVENOUS ONCE
Refills: 0 | Status: DISCONTINUED | OUTPATIENT
Start: 2025-08-16 | End: 2025-08-16

## 2025-08-16 RX ADMIN — KETOROLAC TROMETHAMINE 15 MILLIGRAM(S): 30 INJECTION, SOLUTION INTRAMUSCULAR; INTRAVENOUS at 19:50

## 2025-09-07 ENCOUNTER — EMERGENCY (EMERGENCY)
Facility: HOSPITAL | Age: 35
LOS: 0 days | Discharge: HOME | End: 2025-09-07
Attending: EMERGENCY MEDICINE
Payer: MEDICAID

## 2025-09-07 VITALS
SYSTOLIC BLOOD PRESSURE: 114 MMHG | TEMPERATURE: 98 F | HEIGHT: 67 IN | HEART RATE: 59 BPM | OXYGEN SATURATION: 98 % | RESPIRATION RATE: 19 BRPM | DIASTOLIC BLOOD PRESSURE: 70 MMHG

## 2025-09-07 VITALS
RESPIRATION RATE: 18 BRPM | HEART RATE: 65 BPM | DIASTOLIC BLOOD PRESSURE: 72 MMHG | TEMPERATURE: 99 F | SYSTOLIC BLOOD PRESSURE: 115 MMHG | OXYGEN SATURATION: 99 %

## 2025-09-07 DIAGNOSIS — O23.41 UNSPECIFIED INFECTION OF URINARY TRACT IN PREGNANCY, FIRST TRIMESTER: ICD-10-CM

## 2025-09-07 DIAGNOSIS — Z90.49 ACQUIRED ABSENCE OF OTHER SPECIFIED PARTS OF DIGESTIVE TRACT: Chronic | ICD-10-CM

## 2025-09-07 DIAGNOSIS — O20.9 HEMORRHAGE IN EARLY PREGNANCY, UNSPECIFIED: ICD-10-CM

## 2025-09-07 DIAGNOSIS — R11.0 NAUSEA: ICD-10-CM

## 2025-09-07 DIAGNOSIS — O99.891 OTHER SPECIFIED DISEASES AND CONDITIONS COMPLICATING PREGNANCY: ICD-10-CM

## 2025-09-07 DIAGNOSIS — Z3A.01 LESS THAN 8 WEEKS GESTATION OF PREGNANCY: ICD-10-CM

## 2025-09-07 LAB
ALBUMIN SERPL ELPH-MCNC: 4.4 G/DL — SIGNIFICANT CHANGE UP (ref 3.5–5.2)
ALP SERPL-CCNC: 47 U/L — SIGNIFICANT CHANGE UP (ref 30–115)
ALT FLD-CCNC: 23 U/L — SIGNIFICANT CHANGE UP (ref 0–41)
ANION GAP SERPL CALC-SCNC: 14 MMOL/L — SIGNIFICANT CHANGE UP (ref 7–14)
APPEARANCE UR: ABNORMAL
APTT BLD: 35.3 SEC — SIGNIFICANT CHANGE UP (ref 27–39.2)
AST SERPL-CCNC: 44 U/L — HIGH (ref 0–41)
BACTERIA # UR AUTO: ABNORMAL /HPF
BASOPHILS # BLD AUTO: 0.05 K/UL — SIGNIFICANT CHANGE UP (ref 0–0.2)
BASOPHILS NFR BLD AUTO: 0.8 % — SIGNIFICANT CHANGE UP (ref 0–1)
BILIRUB SERPL-MCNC: 0.9 MG/DL — SIGNIFICANT CHANGE UP (ref 0.2–1.2)
BILIRUB UR-MCNC: ABNORMAL
BLD GP AB SCN SERPL QL: SIGNIFICANT CHANGE UP
BUN SERPL-MCNC: 6 MG/DL — LOW (ref 10–20)
CALCIUM SERPL-MCNC: 9.4 MG/DL — SIGNIFICANT CHANGE UP (ref 8.4–10.5)
CAST: 4 /LPF — SIGNIFICANT CHANGE UP (ref 0–4)
CHLORIDE SERPL-SCNC: 103 MMOL/L — SIGNIFICANT CHANGE UP (ref 98–110)
CO2 SERPL-SCNC: 17 MMOL/L — SIGNIFICANT CHANGE UP (ref 17–32)
COLOR SPEC: ABNORMAL
CREAT SERPL-MCNC: 0.7 MG/DL — SIGNIFICANT CHANGE UP (ref 0.7–1.5)
DIFF PNL FLD: ABNORMAL
EGFR: 116 ML/MIN/1.73M2 — SIGNIFICANT CHANGE UP
EGFR: 116 ML/MIN/1.73M2 — SIGNIFICANT CHANGE UP
EOSINOPHIL # BLD AUTO: 0.27 K/UL — SIGNIFICANT CHANGE UP (ref 0–0.7)
EOSINOPHIL NFR BLD AUTO: 4.1 % — SIGNIFICANT CHANGE UP (ref 0–8)
GLUCOSE SERPL-MCNC: 100 MG/DL — HIGH (ref 70–99)
GLUCOSE UR QL: NEGATIVE MG/DL — SIGNIFICANT CHANGE UP
HCG SERPL-ACNC: HIGH MIU/ML
HCT VFR BLD CALC: 39.7 % — SIGNIFICANT CHANGE UP (ref 37–47)
HGB BLD-MCNC: 13.2 G/DL — SIGNIFICANT CHANGE UP (ref 12–16)
IMM GRANULOCYTES NFR BLD AUTO: 0.3 % — SIGNIFICANT CHANGE UP (ref 0.1–0.3)
INR BLD: 1.06 RATIO — SIGNIFICANT CHANGE UP (ref 0.65–1.3)
KETONES UR QL: 40 MG/DL
LEUKOCYTE ESTERASE UR-ACNC: ABNORMAL
LYMPHOCYTES # BLD AUTO: 1.42 K/UL — SIGNIFICANT CHANGE UP (ref 1.2–3.4)
LYMPHOCYTES # BLD AUTO: 21.6 % — SIGNIFICANT CHANGE UP (ref 20.5–51.1)
MCHC RBC-ENTMCNC: 28.5 PG — SIGNIFICANT CHANGE UP (ref 27–31)
MCHC RBC-ENTMCNC: 33.2 G/DL — SIGNIFICANT CHANGE UP (ref 32–37)
MCV RBC AUTO: 85.7 FL — SIGNIFICANT CHANGE UP (ref 81–99)
MONOCYTES # BLD AUTO: 0.7 K/UL — HIGH (ref 0.1–0.6)
MONOCYTES NFR BLD AUTO: 10.7 % — HIGH (ref 1.7–9.3)
NEUTROPHILS # BLD AUTO: 4.11 K/UL — SIGNIFICANT CHANGE UP (ref 1.4–6.5)
NEUTROPHILS NFR BLD AUTO: 62.5 % — SIGNIFICANT CHANGE UP (ref 42.2–75.2)
NITRITE UR-MCNC: POSITIVE
NRBC BLD AUTO-RTO: 0 /100 WBCS — SIGNIFICANT CHANGE UP (ref 0–0)
PH UR: 5.5 — SIGNIFICANT CHANGE UP (ref 5–8)
PLATELET # BLD AUTO: 319 K/UL — SIGNIFICANT CHANGE UP (ref 130–400)
PMV BLD: 10.7 FL — HIGH (ref 7.4–10.4)
POTASSIUM SERPL-MCNC: 5.4 MMOL/L — HIGH (ref 3.5–5)
POTASSIUM SERPL-SCNC: 5.4 MMOL/L — HIGH (ref 3.5–5)
PROT SERPL-MCNC: 7.9 G/DL — SIGNIFICANT CHANGE UP (ref 6–8)
PROT UR-MCNC: 30 MG/DL
PROTHROM AB SERPL-ACNC: 12.5 SEC — SIGNIFICANT CHANGE UP (ref 9.95–12.87)
RBC # BLD: 4.63 M/UL — SIGNIFICANT CHANGE UP (ref 4.2–5.4)
RBC # FLD: 14.2 % — SIGNIFICANT CHANGE UP (ref 11.5–14.5)
RBC CASTS # UR COMP ASSIST: 7 /HPF — HIGH (ref 0–4)
SODIUM SERPL-SCNC: 134 MMOL/L — LOW (ref 135–146)
SP GR SPEC: >1.03 — HIGH (ref 1–1.03)
SQUAMOUS # UR AUTO: 36 /HPF — HIGH (ref 0–5)
UROBILINOGEN FLD QL: 2 MG/DL (ref 0.2–1)
WBC # BLD: 6.57 K/UL — SIGNIFICANT CHANGE UP (ref 4.8–10.8)
WBC # FLD AUTO: 6.57 K/UL — SIGNIFICANT CHANGE UP (ref 4.8–10.8)
WBC UR QL: 5 /HPF — SIGNIFICANT CHANGE UP (ref 0–5)

## 2025-09-07 PROCEDURE — 76817 TRANSVAGINAL US OBSTETRIC: CPT

## 2025-09-07 PROCEDURE — 85730 THROMBOPLASTIN TIME PARTIAL: CPT

## 2025-09-07 PROCEDURE — 99284 EMERGENCY DEPT VISIT MOD MDM: CPT | Mod: 25

## 2025-09-07 PROCEDURE — 80053 COMPREHEN METABOLIC PANEL: CPT

## 2025-09-07 PROCEDURE — 96374 THER/PROPH/DIAG INJ IV PUSH: CPT

## 2025-09-07 PROCEDURE — 86901 BLOOD TYPING SEROLOGIC RH(D): CPT

## 2025-09-07 PROCEDURE — 86850 RBC ANTIBODY SCREEN: CPT

## 2025-09-07 PROCEDURE — 86900 BLOOD TYPING SEROLOGIC ABO: CPT

## 2025-09-07 PROCEDURE — 87086 URINE CULTURE/COLONY COUNT: CPT

## 2025-09-07 PROCEDURE — 85025 COMPLETE CBC W/AUTO DIFF WBC: CPT

## 2025-09-07 PROCEDURE — 99285 EMERGENCY DEPT VISIT HI MDM: CPT

## 2025-09-07 PROCEDURE — 85610 PROTHROMBIN TIME: CPT

## 2025-09-07 PROCEDURE — 36415 COLL VENOUS BLD VENIPUNCTURE: CPT

## 2025-09-07 PROCEDURE — 76817 TRANSVAGINAL US OBSTETRIC: CPT | Mod: 26

## 2025-09-07 PROCEDURE — 84702 CHORIONIC GONADOTROPIN TEST: CPT

## 2025-09-07 PROCEDURE — 81001 URINALYSIS AUTO W/SCOPE: CPT

## 2025-09-07 PROCEDURE — 87077 CULTURE AEROBIC IDENTIFY: CPT

## 2025-09-07 RX ORDER — ONDANSETRON HCL/PF 4 MG/2 ML
4 VIAL (ML) INJECTION ONCE
Refills: 0 | Status: COMPLETED | OUTPATIENT
Start: 2025-09-07 | End: 2025-09-07

## 2025-09-07 RX ORDER — CEPHALEXIN 250 MG/1
1 CAPSULE ORAL
Qty: 28 | Refills: 0
Start: 2025-09-07 | End: 2025-09-13

## 2025-09-07 RX ORDER — SODIUM CHLORIDE 9 G/1000ML
1000 INJECTION, SOLUTION INTRAVENOUS ONCE
Refills: 0 | Status: COMPLETED | OUTPATIENT
Start: 2025-09-07 | End: 2025-09-07

## 2025-09-07 RX ADMIN — Medication 4 MILLIGRAM(S): at 16:45

## 2025-09-07 RX ADMIN — SODIUM CHLORIDE 1000 MILLILITER(S): 9 INJECTION, SOLUTION INTRAVENOUS at 14:32

## 2025-09-09 LAB
CULTURE RESULTS: ABNORMAL
SPECIMEN SOURCE: SIGNIFICANT CHANGE UP